# Patient Record
Sex: FEMALE | Race: WHITE | NOT HISPANIC OR LATINO
[De-identification: names, ages, dates, MRNs, and addresses within clinical notes are randomized per-mention and may not be internally consistent; named-entity substitution may affect disease eponyms.]

---

## 2017-05-31 ENCOUNTER — APPOINTMENT (OUTPATIENT)
Dept: CARDIOLOGY | Facility: CLINIC | Age: 52
End: 2017-05-31

## 2017-05-31 PROBLEM — Z00.00 ENCOUNTER FOR PREVENTIVE HEALTH EXAMINATION: Status: ACTIVE | Noted: 2017-05-31

## 2017-09-14 ENCOUNTER — RESULT REVIEW (OUTPATIENT)
Age: 52
End: 2017-09-14

## 2019-10-02 ENCOUNTER — RECORD ABSTRACTING (OUTPATIENT)
Age: 54
End: 2019-10-02

## 2019-10-02 DIAGNOSIS — F17.200 NICOTINE DEPENDENCE, UNSPECIFIED, UNCOMPLICATED: ICD-10-CM

## 2019-10-02 DIAGNOSIS — R87.610 ATYPICAL SQUAMOUS CELLS OF UNDETERMINED SIGNIFICANCE ON CYTOLOGIC SMEAR OF CERVIX (ASC-US): ICD-10-CM

## 2019-10-02 DIAGNOSIS — G40.909 EPILEPSY, UNSPECIFIED, NOT INTRACTABLE, W/OUT STATUS EPILEPTICUS: ICD-10-CM

## 2019-10-02 DIAGNOSIS — Z92.89 PERSONAL HISTORY OF OTHER MEDICAL TREATMENT: ICD-10-CM

## 2019-10-02 DIAGNOSIS — R23.2 FLUSHING: ICD-10-CM

## 2019-10-02 DIAGNOSIS — F32.9 MAJOR DEPRESSIVE DISORDER, SINGLE EPISODE, UNSPECIFIED: ICD-10-CM

## 2019-10-02 DIAGNOSIS — Z86.19 PERSONAL HISTORY OF OTHER INFECTIOUS AND PARASITIC DISEASES: ICD-10-CM

## 2019-10-02 DIAGNOSIS — Z80.3 FAMILY HISTORY OF MALIGNANT NEOPLASM OF BREAST: ICD-10-CM

## 2019-10-02 DIAGNOSIS — Z80.41 FAMILY HISTORY OF MALIGNANT NEOPLASM OF OVARY: ICD-10-CM

## 2019-10-02 DIAGNOSIS — G47.00 INSOMNIA, UNSPECIFIED: ICD-10-CM

## 2019-10-02 DIAGNOSIS — N95.2 POSTMENOPAUSAL ATROPHIC VAGINITIS: ICD-10-CM

## 2019-10-02 DIAGNOSIS — G47.9 SLEEP DISORDER, UNSPECIFIED: ICD-10-CM

## 2019-10-02 DIAGNOSIS — Z83.3 FAMILY HISTORY OF DIABETES MELLITUS: ICD-10-CM

## 2019-10-02 LAB — CYTOLOGY CVX/VAG DOC THIN PREP: NORMAL

## 2019-11-21 ENCOUNTER — APPOINTMENT (OUTPATIENT)
Dept: OBGYN | Facility: CLINIC | Age: 54
End: 2019-11-21
Payer: COMMERCIAL

## 2019-11-21 ENCOUNTER — TRANSCRIPTION ENCOUNTER (OUTPATIENT)
Age: 54
End: 2019-11-21

## 2019-11-21 VITALS
HEIGHT: 67 IN | SYSTOLIC BLOOD PRESSURE: 116 MMHG | DIASTOLIC BLOOD PRESSURE: 70 MMHG | WEIGHT: 179 LBS | BODY MASS INDEX: 28.09 KG/M2

## 2019-11-21 PROCEDURE — 99396 PREV VISIT EST AGE 40-64: CPT

## 2019-11-21 PROCEDURE — 82270 OCCULT BLOOD FECES: CPT

## 2019-11-21 RX ORDER — CLONIDINE 0.2 MG/24H
0.2 PATCH, EXTENDED RELEASE TRANSDERMAL
Refills: 0 | Status: DISCONTINUED | COMMUNITY
End: 2019-11-21

## 2019-12-26 LAB
CYTOLOGY CVX/VAG DOC THIN PREP: ABNORMAL
DATE COLLECTED: NORMAL
HEMOCCULT SP1 STL QL: NEGATIVE
HPV HIGH+LOW RISK DNA PNL CVX: NOT DETECTED
QUALITY CONTROL: YES

## 2020-04-13 NOTE — PHYSICAL EXAM
[Awake] : awake [Alert] : alert [Soft] : soft [Oriented x3] : oriented to person, place, and time [Labia Majora] : labia major [Labia Minora] : labia minora [Normal] : clitoris [No Bleeding] : there was no active vaginal bleeding [Pap Obtained] : a Pap smear was performed [Uterine Adnexae] : were not tender and not enlarged [No Tenderness] : no rectal tenderness [Nl Sphincter Tone] : normal sphincter tone [Acute Distress] : no acute distress [Mass] : no breast mass [Nipple Discharge] : no nipple discharge [Axillary LAD] : no axillary lymphadenopathy [Tender] : non tender [Occult Blood] : occult blood test from digital rectal exam was negative

## 2020-04-13 NOTE — HISTORY OF PRESENT ILLNESS
[Fair] : being in fair health [Healthy Diet] : a healthy diet [Regular Exercise] : regular exercise [Last Mammogram ___] : Last Mammogram was [unfilled] [Last Bone Density ___] : Last bone density studies [unfilled] [Last Colonoscopy ___] : Last colonoscopy [unfilled] [Last Pap ___] : Last cervical pap smear was [unfilled] [Postmenopausal] : is postmenopausal [Pregnancy History] : pregnancy history: [Total Preg ___] : [unfilled] [Full Term ___] : [unfilled] [Living ___] : [unfilled] [Monogamous (Male Partner)] : is monogamous with a male partner [Hot Flashes] : hot flashes [Night Sweats] : night sweats [Definite:  ___ (Date)] : the last menstrual period was [unfilled] [Menarche Age: ____] : age at menarche was [unfilled] [Sexually Active] : is sexually active [Monogamous] : is monogamous [Male ___] : [unfilled] male [Weight Concerns] : no concerns with her weight [Menstrual Problems] : reports normal menses [Contraception] : does not use contraception [de-identified] : 11/14/2018 [de-identified] : JESSICA U/S 8/13/2015 [Vaginal Itching] : no vaginal itching [Dyspareunia] : no dyspareunia [Mood Changes] : no mood changes

## 2020-04-13 NOTE — END OF VISIT
[FreeTextEntry3] : I, Caity Fournier, acted solely as a scribe for Dr. Roman on this 11/21/19. \par \par All medical record entries made by the Scribe were at Dr. Roman's direction and personally dictated by me on 11/21/19. I have reviewed the chart and agree that the record accurately reflects my personal performance of history, physical exam, assessment and plan. I have also personally directed, reviewed, and agreed with the chart.

## 2021-04-20 ENCOUNTER — APPOINTMENT (OUTPATIENT)
Dept: OBGYN | Facility: CLINIC | Age: 56
End: 2021-04-20
Payer: COMMERCIAL

## 2021-04-20 ENCOUNTER — RESULT CHARGE (OUTPATIENT)
Age: 56
End: 2021-04-20

## 2021-04-20 VITALS
HEIGHT: 67 IN | SYSTOLIC BLOOD PRESSURE: 132 MMHG | BODY MASS INDEX: 29.55 KG/M2 | DIASTOLIC BLOOD PRESSURE: 76 MMHG | WEIGHT: 188.25 LBS

## 2021-04-20 DIAGNOSIS — Z01.419 ENCOUNTER FOR GYNECOLOGICAL EXAMINATION (GENERAL) (ROUTINE) W/OUT ABNORMAL FINDINGS: ICD-10-CM

## 2021-04-20 PROCEDURE — 81003 URINALYSIS AUTO W/O SCOPE: CPT | Mod: QW

## 2021-04-20 PROCEDURE — 99396 PREV VISIT EST AGE 40-64: CPT

## 2021-04-20 PROCEDURE — 99072 ADDL SUPL MATRL&STAF TM PHE: CPT

## 2021-04-25 NOTE — HISTORY OF PRESENT ILLNESS
[Patient reported PAP Smear was abnormal] : Patient reported PAP Smear was abnormal [N] : Patient reports normal menses [Tubal Occlusion] : has had a tubal occlusion [Monogamous (Male Partner)] : is monogamous with a male partner [Y] : Positive pregnancy history [unknown] : Patient is unsure of the date of her LMP [Menarche Age: ____] : age at menarche was [unfilled] [Currently Active] : currently active [Men] : men [Vaginal] : vaginal [No] : No [Patient refuses STI testing] : Patient refuses STI testing [FreeTextEntry1] : SAGAR is here for her annual exam . She feels well with no gynecological complaints.\par \par Her last health maintenance visit was 2019. Patient reports normal menses. Last mammogram done on 2019 was significant for BR2. Patient had breast augmentation in 2019. Last cervical pap smear was done on 19 significant for ASCUS. Pregnancy history: .\par \par Patient reports she has experienced recent weight gain. Patient states that she has tried Weight Watchers but was not able to lose weight. Patient states she is now using appetite suppression injections. Patient states she has frequent constipation and takes medication daily to regulate her bowel movements. \par \par Patient reports she fell at work, resulting in a swollen neck. Patient also states that she fell back in . Patient is following with Orthopedist Dr. Perez. 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

## 2021-04-25 NOTE — DISCUSSION/SUMMARY
[FreeTextEntry1] : During this visit comprehensive counseling was given regarding the following concerns: \par \par 1. We discussed the need for proper nutrition and exercise. This includes cardiovascular and pelvic floor exercise. \par \par 2. We discussed the importance of maintaining a proper vaccination schedule and we discussed the utility of the flu vaccine and TDaP. \par \par 3. We discussed the impact of chronic sun exposure and the risk for skin disease as a result. The benefits of a total body scan by a Dermatologist was reviewed. \par \par 4. We discussed the need for certain supplements for most people which include Vitamin D3, calcium rich foods with limitation on calcium supplementation by tabular form to 600 mg by mouth daily. As a part of a bone health program we recommended weight bearing exercises, and some limited sun exposure (10-15 minutes daily) to help convert Vitamin D to its active form.\par \par 5. The definition of perimenopause and menopause were discussed with the patient. the etiology of vasomotor symptoms was explained. All of her concerns were addressed, questions answered and reassurances were given.\par \par We reviewed the results of  today's pelvic exam, significant for folliculitis. The importance of using a clean razor when shaving was discussed. Pap collected today. Prescription for mammogram screening given.\par \par During this visit 20 minutes were spent face-to-face with greater than 50% of the time dedicated to counseling.\par \par Patient will return in 1 year for annual or PRN.

## 2021-04-25 NOTE — REVIEW OF SYSTEMS
[Sight Problems] : sight problems [Breast Pain] : breast pain [Back Pain] : back pain [Negative] : Heme/Lymph [FreeTextEntry9] : NECK PAIN

## 2021-04-25 NOTE — END OF VISIT
[FreeTextEntry3] : I, Shazia Ndiaye solely acted as scribe for Dr. Usama Roman on 04/20/2021. All medical entries made by the Scribe were at my, Dr. Roman's, direction and personally dictated by me on 04/20/2021. I have reviewed the chart and agree that the record accurately reflects my personal performance of the history, physical exam, assessment, and plan. I have also personally directed, reviewed, and agreed with the chart.

## 2021-04-25 NOTE — PHYSICAL EXAM
[Appropriately responsive] : appropriately responsive [Alert] : alert [No Acute Distress] : no acute distress [No Lymphadenopathy] : no lymphadenopathy [Soft] : soft [Non-tender] : non-tender [Non-distended] : non-distended [No HSM] : No HSM [No Lesions] : no lesions [No Mass] : no mass [Oriented x3] : oriented x3 [Examination Of The Breasts] : a normal appearance [] : implants [No Discharge] : no discharge [No Masses] : no breast masses were palpable [Labia Majora] : normal [Labia Minora] : normal [No Bleeding] : There was no active vaginal bleeding [Normal] : normal [Uterine Adnexae] : normal [No Tenderness] : no tenderness [Nl Sphincter Tone] : normal sphincter tone [FreeTextEntry9] : Stool for occult blood.

## 2021-11-10 ENCOUNTER — OUTPATIENT (OUTPATIENT)
Dept: OUTPATIENT SERVICES | Facility: HOSPITAL | Age: 56
LOS: 1 days | End: 2021-11-10
Payer: OTHER MISCELLANEOUS

## 2021-11-10 VITALS
WEIGHT: 167.55 LBS | OXYGEN SATURATION: 98 % | SYSTOLIC BLOOD PRESSURE: 118 MMHG | DIASTOLIC BLOOD PRESSURE: 80 MMHG | TEMPERATURE: 98 F | HEIGHT: 66.5 IN | RESPIRATION RATE: 16 BRPM | HEART RATE: 87 BPM

## 2021-11-10 DIAGNOSIS — Z98.891 HISTORY OF UTERINE SCAR FROM PREVIOUS SURGERY: Chronic | ICD-10-CM

## 2021-11-10 DIAGNOSIS — Z90.49 ACQUIRED ABSENCE OF OTHER SPECIFIED PARTS OF DIGESTIVE TRACT: Chronic | ICD-10-CM

## 2021-11-10 DIAGNOSIS — Z90.89 ACQUIRED ABSENCE OF OTHER ORGANS: Chronic | ICD-10-CM

## 2021-11-10 DIAGNOSIS — Z98.890 OTHER SPECIFIED POSTPROCEDURAL STATES: Chronic | ICD-10-CM

## 2021-11-10 DIAGNOSIS — M43.22 FUSION OF SPINE, CERVICAL REGION: Chronic | ICD-10-CM

## 2021-11-10 DIAGNOSIS — Z01.818 ENCOUNTER FOR OTHER PREPROCEDURAL EXAMINATION: ICD-10-CM

## 2021-11-10 DIAGNOSIS — M54.2 CERVICALGIA: ICD-10-CM

## 2021-11-10 DIAGNOSIS — Z29.9 ENCOUNTER FOR PROPHYLACTIC MEASURES, UNSPECIFIED: ICD-10-CM

## 2021-11-10 LAB
A1C WITH ESTIMATED AVERAGE GLUCOSE RESULT: 5.3 % — SIGNIFICANT CHANGE UP (ref 4–5.6)
ALBUMIN SERPL ELPH-MCNC: 3.7 G/DL — SIGNIFICANT CHANGE UP (ref 3.3–5)
ANION GAP SERPL CALC-SCNC: 5 MMOL/L — SIGNIFICANT CHANGE UP (ref 5–17)
APPEARANCE UR: CLEAR — SIGNIFICANT CHANGE UP
APTT BLD: 30.5 SEC — SIGNIFICANT CHANGE UP (ref 27.5–35.5)
BASOPHILS # BLD AUTO: 0.07 K/UL — SIGNIFICANT CHANGE UP (ref 0–0.2)
BASOPHILS NFR BLD AUTO: 0.5 % — SIGNIFICANT CHANGE UP (ref 0–2)
BILIRUB UR-MCNC: NEGATIVE — SIGNIFICANT CHANGE UP
BUN SERPL-MCNC: 15 MG/DL — SIGNIFICANT CHANGE UP (ref 7–23)
CALCIUM SERPL-MCNC: 9.4 MG/DL — SIGNIFICANT CHANGE UP (ref 8.5–10.1)
CHLORIDE SERPL-SCNC: 108 MMOL/L — SIGNIFICANT CHANGE UP (ref 96–108)
CO2 SERPL-SCNC: 26 MMOL/L — SIGNIFICANT CHANGE UP (ref 22–31)
COLOR SPEC: YELLOW — SIGNIFICANT CHANGE UP
CREAT SERPL-MCNC: 0.83 MG/DL — SIGNIFICANT CHANGE UP (ref 0.5–1.3)
DIFF PNL FLD: NEGATIVE — SIGNIFICANT CHANGE UP
EOSINOPHIL # BLD AUTO: 0.11 K/UL — SIGNIFICANT CHANGE UP (ref 0–0.5)
EOSINOPHIL NFR BLD AUTO: 0.8 % — SIGNIFICANT CHANGE UP (ref 0–6)
ESTIMATED AVERAGE GLUCOSE: 105 MG/DL — SIGNIFICANT CHANGE UP (ref 68–114)
GLUCOSE SERPL-MCNC: 100 MG/DL — HIGH (ref 70–99)
GLUCOSE UR QL: NEGATIVE MG/DL — SIGNIFICANT CHANGE UP
HCT VFR BLD CALC: 42.4 % — SIGNIFICANT CHANGE UP (ref 34.5–45)
HGB BLD-MCNC: 14.3 G/DL — SIGNIFICANT CHANGE UP (ref 11.5–15.5)
IMM GRANULOCYTES NFR BLD AUTO: 0.5 % — SIGNIFICANT CHANGE UP (ref 0–1.5)
INR BLD: 0.92 RATIO — SIGNIFICANT CHANGE UP (ref 0.88–1.16)
KETONES UR-MCNC: NEGATIVE — SIGNIFICANT CHANGE UP
LEUKOCYTE ESTERASE UR-ACNC: NEGATIVE — SIGNIFICANT CHANGE UP
LYMPHOCYTES # BLD AUTO: 28 % — SIGNIFICANT CHANGE UP (ref 13–44)
LYMPHOCYTES # BLD AUTO: 3.64 K/UL — HIGH (ref 1–3.3)
MCHC RBC-ENTMCNC: 30.8 PG — SIGNIFICANT CHANGE UP (ref 27–34)
MCHC RBC-ENTMCNC: 33.7 GM/DL — SIGNIFICANT CHANGE UP (ref 32–36)
MCV RBC AUTO: 91.4 FL — SIGNIFICANT CHANGE UP (ref 80–100)
MONOCYTES # BLD AUTO: 0.63 K/UL — SIGNIFICANT CHANGE UP (ref 0–0.9)
MONOCYTES NFR BLD AUTO: 4.8 % — SIGNIFICANT CHANGE UP (ref 2–14)
MRSA PCR RESULT.: SIGNIFICANT CHANGE UP
NEUTROPHILS # BLD AUTO: 8.49 K/UL — HIGH (ref 1.8–7.4)
NEUTROPHILS NFR BLD AUTO: 65.4 % — SIGNIFICANT CHANGE UP (ref 43–77)
NITRITE UR-MCNC: NEGATIVE — SIGNIFICANT CHANGE UP
PH UR: 5 — SIGNIFICANT CHANGE UP (ref 5–8)
PLATELET # BLD AUTO: 301 K/UL — SIGNIFICANT CHANGE UP (ref 150–400)
POTASSIUM SERPL-MCNC: 4.4 MMOL/L — SIGNIFICANT CHANGE UP (ref 3.5–5.3)
POTASSIUM SERPL-SCNC: 4.4 MMOL/L — SIGNIFICANT CHANGE UP (ref 3.5–5.3)
PROT UR-MCNC: NEGATIVE MG/DL — SIGNIFICANT CHANGE UP
PROTHROM AB SERPL-ACNC: 10.8 SEC — SIGNIFICANT CHANGE UP (ref 10.6–13.6)
RBC # BLD: 4.64 M/UL — SIGNIFICANT CHANGE UP (ref 3.8–5.2)
RBC # FLD: 12.7 % — SIGNIFICANT CHANGE UP (ref 10.3–14.5)
S AUREUS DNA NOSE QL NAA+PROBE: DETECTED
SODIUM SERPL-SCNC: 139 MMOL/L — SIGNIFICANT CHANGE UP (ref 135–145)
SP GR SPEC: 1.02 — SIGNIFICANT CHANGE UP (ref 1.01–1.02)
UROBILINOGEN FLD QL: NEGATIVE MG/DL — SIGNIFICANT CHANGE UP
WBC # BLD: 13 K/UL — HIGH (ref 3.8–10.5)
WBC # FLD AUTO: 13 K/UL — HIGH (ref 3.8–10.5)

## 2021-11-10 PROCEDURE — 82040 ASSAY OF SERUM ALBUMIN: CPT

## 2021-11-10 PROCEDURE — 85730 THROMBOPLASTIN TIME PARTIAL: CPT

## 2021-11-10 PROCEDURE — G0463: CPT | Mod: 25

## 2021-11-10 PROCEDURE — 81003 URINALYSIS AUTO W/O SCOPE: CPT

## 2021-11-10 PROCEDURE — 86900 BLOOD TYPING SEROLOGIC ABO: CPT

## 2021-11-10 PROCEDURE — 85025 COMPLETE CBC W/AUTO DIFF WBC: CPT

## 2021-11-10 PROCEDURE — 80048 BASIC METABOLIC PNL TOTAL CA: CPT

## 2021-11-10 PROCEDURE — 93005 ELECTROCARDIOGRAM TRACING: CPT

## 2021-11-10 PROCEDURE — 85610 PROTHROMBIN TIME: CPT

## 2021-11-10 PROCEDURE — 86850 RBC ANTIBODY SCREEN: CPT

## 2021-11-10 PROCEDURE — 36415 COLL VENOUS BLD VENIPUNCTURE: CPT

## 2021-11-10 PROCEDURE — 93010 ELECTROCARDIOGRAM REPORT: CPT

## 2021-11-10 PROCEDURE — 86901 BLOOD TYPING SEROLOGIC RH(D): CPT

## 2021-11-10 PROCEDURE — 87641 MR-STAPH DNA AMP PROBE: CPT

## 2021-11-10 PROCEDURE — 71046 X-RAY EXAM CHEST 2 VIEWS: CPT | Mod: 26

## 2021-11-10 PROCEDURE — 83036 HEMOGLOBIN GLYCOSYLATED A1C: CPT

## 2021-11-10 PROCEDURE — 87640 STAPH A DNA AMP PROBE: CPT

## 2021-11-10 PROCEDURE — 71046 X-RAY EXAM CHEST 2 VIEWS: CPT

## 2021-11-10 NOTE — H&P PST ADULT - ASSESSMENT
56 y.o female scheduled for  56 y.o female scheduled for  Anterior Cervical Spine Fusion, Anterior Cervical Discectomy and Fusion, C4-5, C 6-7, Cages, Local Bone Graft, Bone Protein Segmental Spinal Instrumentation   Plan  1. Stop all NSAIDS, herbal supplements and vitamins for 7 days.  2. NPO at midnight.  3. Take the following medications Omeprazole  with small sips of water on the morning of your procedure/surgery.  4. Use EZ sponges as directed  5. Use mupirocin as directed  6. Labs, EKG, CXR as per surgeon  7. PMD CORBIN Santiago  visit for optimization prior to surgery as per surgeon.  8. COVID swab appt: 11/15/2021    CAPRINI SCORE    AGE RELATED RISK FACTORS                                                       MOBILITY RELATED FACTORS  [ x] Age 41-60 years                                            (1 Point)                  [ ] Bed rest                                                        (1 Point)  [ ] Age: 61-74 years                                           (2 Points)                [ ] Plaster cast                                                   (2 Points)  [ ] Age= 75 years                                              (3 Points)                 [ ] Bed bound for more than 72 hours                   (2 Points)    DISEASE RELATED RISK FACTORS                                               GENDER SPECIFIC FACTORS  [ ] Edema in the lower extremities                       (1 Point)                  [ ] Pregnancy                                                     (1 Point)  [ ] Varicose veins                                               (1 Point)                  [ ] Post-partum < 6 weeks                                   (1 Point)             [x ] BMI > 25 Kg/m2                                            (1 Point)                  [ ] Hormonal therapy  or oral contraception            (1 Point)                 [ ] Sepsis (in the previous month)                        (1 Point)                  [ ] History of pregnancy complications  [ ] Pneumonia or serious lung disease                                               [ ] Unexplained or recurrent                       (1 Point)           (in the previous month)                               (1 Point)  [ ] Abnormal pulmonary function test                     (1 Point)                 SURGERY RELATED RISK FACTORS  [ ] Acute myocardial infarction                              (1 Point)                 [ ]  Section                                            (1 Point)  [ ] Congestive heart failure (in the previous month)  (1 Point)                 [ ] Minor surgery                                                 (1 Point)   [ ] Inflammatory bowel disease                             (1 Point)                 [ ] Arthroscopic surgery                                        (2 Points)  [ ] Central venous access                                    (2 Points)                [ x] General surgery lasting more than 45 minutes   (2 Points)       [ ] Stroke (in the previous month)                          (5 Points)               [ ] Elective arthroplasty                                        (5 Points)                                                                                                                                               HEMATOLOGY RELATED FACTORS                                                 TRAUMA RELATED RISK FACTORS  [ ] Prior episodes of VTE                                     (3 Points)                 [ ] Fracture of the hip, pelvis, or leg                       (5 Points)  [ ] Positive family history for VTE                         (3 Points)                 [ ] Acute spinal cord injury (in the previous month)  (5 Points)  [ ] Prothrombin 82252 A                                      (3 Points)                 [ ] Paralysis  (less than 1 month)                          (5 Points)  [ ] Factor V Leiden                                             (3 Points)                 [ ] Multiple Trauma within 1 month                         (5 Points)  [ ] Lupus anticoagulants                                     (3 Points)                                                           [ ] Anticardiolipin antibodies                                (3 Points)                                                       [ ] High homocysteine in the blood                      (3 Points)                                             [ ] Other congenital or acquired thrombophilia       (3 Points)                                                [ ] Heparin induced thrombocytopenia                  (3 Points)                                          Total Score [      4  ]    The Caprini score indicates this patient is at risk for a VTE event (score 3-5).  Most surgical patients in this group would benefit from pharmacologic prophylaxis.  The surgical team will determine the balance between VTE risk and bleeding risk
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
## 2021-11-10 NOTE — H&P PST ADULT - NSICDXPASTMEDICALHX_GEN_ALL_CORE_FT
PAST MEDICAL HISTORY:  2019 novel coronavirus disease (COVID-19) 12/2020--Hospitalized , pericarditis , recovered , followed with cardiology post discharge Dr Delta Davalos    Allergic sinusitis     Cervical disc disease     Cervical disc herniation     COVID-19 vaccine series completed Pfizer --completed 4/2021, booster Moderna--11/1/2021    GERD (gastroesophageal reflux disease)     History of colitis     Sleep apnea No CPAP or oral appliance--patient states insurance did not cover

## 2021-11-10 NOTE — H&P PST ADULT - FUNCTIONAL SCREEN CURRENT LEVEL: DRESSING, MLM
Detail Level: Simple Was A Bandage Applied: Yes Punch Size In Mm: 6 Biopsy Type: H and E Anesthesia Type: 1% lidocaine with epinephrine Anesthesia Volume In Cc (Will Not Render If 0): 1.5 Additional Anesthesia Volume In Cc (Will Not Render If 0): 0 Hemostasis: Dinorah's Epidermal Sutures: 5-0 Fast Absorbing Gut Wound Care: Petrolatum Dressing: pressure dressing with telfa Patient Will Remove Sutures At Home?: No Lab: [facility] 0 = independent Path Notes (To The Dermatopathologist): R/O Consent: Written consent was obtained and risks were reviewed including but not limited to scarring, infection, bleeding, scabbing, incomplete removal, nerve damage and allergy to anesthesia. Post-Care Instructions: I reviewed with the patient in detail post-care instructions. Patient is to keep the biopsy site dry overnight, and then apply bacitracin twice daily until healed. Patient may apply hydrogen peroxide soaks to remove any crusting. Home Suture Removal Text: Patient was provided a home suture removal kit and will remove their sutures at home. If they have any questions or difficulties they will call the office. Notification Instructions: Patient will be notified of biopsy results. However, patient instructed to call the office if not contacted within 2 weeks. Billing Type: [employer] Information: Selecting Yes will display possible errors in your note based on the variables you have selected. This validation is only offered as a suggestion for you. PLEASE NOTE THAT THE VALIDATION TEXT WILL BE REMOVED WHEN YOU FINALIZE YOUR NOTE. IF YOU WANT TO FAX A PRELIMINARY NOTE YOU WILL NEED TO TOGGLE THIS TO 'NO' IF YOU DO NOT WANT IT IN YOUR FAXED NOTE.
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
## 2021-11-10 NOTE — H&P PST ADULT - NSICDXPASTSURGICALHX_GEN_ALL_CORE_FT
PAST SURGICAL HISTORY:  Cervical vertebral fusion     H/O  section , ,    History of cardiac cath 10-15 yrs ago    History of laparoscopic cholecystectomy     History of tonsillectomy 1969

## 2021-11-11 DIAGNOSIS — M54.2 CERVICALGIA: ICD-10-CM

## 2021-11-11 DIAGNOSIS — Z01.818 ENCOUNTER FOR OTHER PREPROCEDURAL EXAMINATION: ICD-10-CM

## 2021-11-11 NOTE — CHART NOTE - NSCHARTNOTEFT_GEN_A_CORE
MSSA detected from nasal swab done in [facility] 11/10/2021. Patient instructed to start applying Mupirocin to nostrils 2 times per day for 5 days starting 11/13/2021. Questions answered.

## 2021-11-15 ENCOUNTER — APPOINTMENT (OUTPATIENT)
Dept: DISASTER EMERGENCY | Facility: CLINIC | Age: 56
End: 2021-11-15

## 2021-11-15 DIAGNOSIS — Z01.818 ENCOUNTER FOR OTHER PREPROCEDURAL EXAMINATION: ICD-10-CM

## 2021-11-16 LAB — SARS-COV-2 N GENE NPH QL NAA+PROBE: NOT DETECTED

## 2021-11-18 ENCOUNTER — RESULT REVIEW (OUTPATIENT)
Age: 56
End: 2021-11-18

## 2021-11-18 ENCOUNTER — INPATIENT (INPATIENT)
Facility: HOSPITAL | Age: 56
LOS: 1 days | Discharge: HOME | DRG: 321 | End: 2021-11-20
Attending: ORTHOPAEDIC SURGERY | Admitting: ORTHOPAEDIC SURGERY
Payer: OTHER MISCELLANEOUS

## 2021-11-18 VITALS
TEMPERATURE: 97 F | HEART RATE: 56 BPM | DIASTOLIC BLOOD PRESSURE: 66 MMHG | RESPIRATION RATE: 16 BRPM | HEIGHT: 66.5 IN | WEIGHT: 167.55 LBS | SYSTOLIC BLOOD PRESSURE: 119 MMHG

## 2021-11-18 DIAGNOSIS — Z98.890 OTHER SPECIFIED POSTPROCEDURAL STATES: Chronic | ICD-10-CM

## 2021-11-18 DIAGNOSIS — Z90.89 ACQUIRED ABSENCE OF OTHER ORGANS: Chronic | ICD-10-CM

## 2021-11-18 DIAGNOSIS — Z90.49 ACQUIRED ABSENCE OF OTHER SPECIFIED PARTS OF DIGESTIVE TRACT: Chronic | ICD-10-CM

## 2021-11-18 DIAGNOSIS — M54.2 CERVICALGIA: ICD-10-CM

## 2021-11-18 DIAGNOSIS — M43.22 FUSION OF SPINE, CERVICAL REGION: Chronic | ICD-10-CM

## 2021-11-18 DIAGNOSIS — Z98.891 HISTORY OF UTERINE SCAR FROM PREVIOUS SURGERY: Chronic | ICD-10-CM

## 2021-11-18 PROCEDURE — C1889: CPT

## 2021-11-18 PROCEDURE — 86769 SARS-COV-2 COVID-19 ANTIBODY: CPT

## 2021-11-18 PROCEDURE — 82962 GLUCOSE BLOOD TEST: CPT

## 2021-11-18 PROCEDURE — 36415 COLL VENOUS BLD VENIPUNCTURE: CPT

## 2021-11-18 PROCEDURE — C1713: CPT

## 2021-11-18 PROCEDURE — 94640 AIRWAY INHALATION TREATMENT: CPT

## 2021-11-18 PROCEDURE — 90686 IIV4 VACC NO PRSV 0.5 ML IM: CPT

## 2021-11-18 PROCEDURE — 76000 FLUOROSCOPY <1 HR PHYS/QHP: CPT

## 2021-11-18 PROCEDURE — 97116 GAIT TRAINING THERAPY: CPT | Mod: GP

## 2021-11-18 PROCEDURE — 88304 TISSUE EXAM BY PATHOLOGIST: CPT | Mod: 26

## 2021-11-18 PROCEDURE — 97161 PT EVAL LOW COMPLEX 20 MIN: CPT | Mod: GP

## 2021-11-18 PROCEDURE — 88304 TISSUE EXAM BY PATHOLOGIST: CPT

## 2021-11-18 PROCEDURE — 85027 COMPLETE CBC AUTOMATED: CPT

## 2021-11-18 PROCEDURE — 80048 BASIC METABOLIC PNL TOTAL CA: CPT

## 2021-11-18 RX ORDER — SODIUM CHLORIDE 9 MG/ML
1000 INJECTION INTRAMUSCULAR; INTRAVENOUS; SUBCUTANEOUS
Refills: 0 | Status: DISCONTINUED | OUTPATIENT
Start: 2021-11-18 | End: 2021-11-20

## 2021-11-18 RX ORDER — NALOXONE HYDROCHLORIDE 4 MG/.1ML
0.1 SPRAY NASAL
Refills: 0 | Status: DISCONTINUED | OUTPATIENT
Start: 2021-11-18 | End: 2021-11-20

## 2021-11-18 RX ORDER — HYDROMORPHONE HYDROCHLORIDE 2 MG/ML
0.5 INJECTION INTRAMUSCULAR; INTRAVENOUS; SUBCUTANEOUS
Refills: 0 | Status: DISCONTINUED | OUTPATIENT
Start: 2021-11-18 | End: 2021-11-19

## 2021-11-18 RX ORDER — HYDROMORPHONE HYDROCHLORIDE 2 MG/ML
2 INJECTION INTRAMUSCULAR; INTRAVENOUS; SUBCUTANEOUS
Refills: 0 | Status: DISCONTINUED | OUTPATIENT
Start: 2021-11-18 | End: 2021-11-20

## 2021-11-18 RX ORDER — CYCLOBENZAPRINE HYDROCHLORIDE 10 MG/1
10 TABLET, FILM COATED ORAL EVERY 8 HOURS
Refills: 0 | Status: DISCONTINUED | OUTPATIENT
Start: 2021-11-18 | End: 2021-11-20

## 2021-11-18 RX ORDER — HYDROMORPHONE HYDROCHLORIDE 2 MG/ML
0.5 INJECTION INTRAMUSCULAR; INTRAVENOUS; SUBCUTANEOUS
Refills: 0 | Status: DISCONTINUED | OUTPATIENT
Start: 2021-11-18 | End: 2021-11-18

## 2021-11-18 RX ORDER — ONDANSETRON 8 MG/1
4 TABLET, FILM COATED ORAL ONCE
Refills: 0 | Status: DISCONTINUED | OUTPATIENT
Start: 2021-11-18 | End: 2021-11-18

## 2021-11-18 RX ORDER — SODIUM CHLORIDE 9 MG/ML
1000 INJECTION, SOLUTION INTRAVENOUS
Refills: 0 | Status: DISCONTINUED | OUTPATIENT
Start: 2021-11-18 | End: 2021-11-18

## 2021-11-18 RX ORDER — MONTELUKAST 4 MG/1
10 TABLET, CHEWABLE ORAL AT BEDTIME
Refills: 0 | Status: DISCONTINUED | OUTPATIENT
Start: 2021-11-18 | End: 2021-11-20

## 2021-11-18 RX ORDER — VANCOMYCIN HCL 1 G
1000 VIAL (EA) INTRAVENOUS EVERY 12 HOURS
Refills: 0 | Status: COMPLETED | OUTPATIENT
Start: 2021-11-18 | End: 2021-11-19

## 2021-11-18 RX ORDER — CELECOXIB 200 MG/1
200 CAPSULE ORAL DAILY
Refills: 0 | Status: DISCONTINUED | OUTPATIENT
Start: 2021-11-19 | End: 2021-11-20

## 2021-11-18 RX ORDER — ONDANSETRON 8 MG/1
4 TABLET, FILM COATED ORAL EVERY 6 HOURS
Refills: 0 | Status: DISCONTINUED | OUTPATIENT
Start: 2021-11-18 | End: 2021-11-20

## 2021-11-18 RX ORDER — LORATADINE 10 MG/1
10 TABLET ORAL DAILY
Refills: 0 | Status: DISCONTINUED | OUTPATIENT
Start: 2021-11-18 | End: 2021-11-20

## 2021-11-18 RX ORDER — INFLUENZA VIRUS VACCINE 15; 15; 15; 15 UG/.5ML; UG/.5ML; UG/.5ML; UG/.5ML
0.5 SUSPENSION INTRAMUSCULAR ONCE
Refills: 0 | Status: COMPLETED | OUTPATIENT
Start: 2021-11-18 | End: 2021-11-20

## 2021-11-18 RX ORDER — CELECOXIB 200 MG/1
400 CAPSULE ORAL ONCE
Refills: 0 | Status: COMPLETED | OUTPATIENT
Start: 2021-11-18 | End: 2021-11-18

## 2021-11-18 RX ORDER — FLUTICASONE PROPIONATE 50 MCG
1 SPRAY, SUSPENSION NASAL DAILY
Refills: 0 | Status: DISCONTINUED | OUTPATIENT
Start: 2021-11-18 | End: 2021-11-20

## 2021-11-18 RX ORDER — VANCOMYCIN HCL 1 G
1000 VIAL (EA) INTRAVENOUS ONCE
Refills: 0 | Status: COMPLETED | OUTPATIENT
Start: 2021-11-18 | End: 2021-11-18

## 2021-11-18 RX ORDER — ACETAMINOPHEN 500 MG
650 TABLET ORAL EVERY 6 HOURS
Refills: 0 | Status: DISCONTINUED | OUTPATIENT
Start: 2021-11-18 | End: 2021-11-20

## 2021-11-18 RX ORDER — PANTOPRAZOLE SODIUM 20 MG/1
40 TABLET, DELAYED RELEASE ORAL
Refills: 0 | Status: DISCONTINUED | OUTPATIENT
Start: 2021-11-18 | End: 2021-11-20

## 2021-11-18 RX ORDER — HYDROMORPHONE HYDROCHLORIDE 2 MG/ML
30 INJECTION INTRAMUSCULAR; INTRAVENOUS; SUBCUTANEOUS
Refills: 0 | Status: DISCONTINUED | OUTPATIENT
Start: 2021-11-18 | End: 2021-11-19

## 2021-11-18 RX ADMIN — Medication 650 MILLIGRAM(S): at 23:48

## 2021-11-18 RX ADMIN — HYDROMORPHONE HYDROCHLORIDE 0.5 MILLIGRAM(S): 2 INJECTION INTRAMUSCULAR; INTRAVENOUS; SUBCUTANEOUS at 11:28

## 2021-11-18 RX ADMIN — SODIUM CHLORIDE 100 MILLILITER(S): 9 INJECTION INTRAMUSCULAR; INTRAVENOUS; SUBCUTANEOUS at 14:09

## 2021-11-18 RX ADMIN — Medication 1 TABLET(S): at 15:08

## 2021-11-18 RX ADMIN — Medication 250 MILLIGRAM(S): at 20:04

## 2021-11-18 RX ADMIN — LORATADINE 10 MILLIGRAM(S): 10 TABLET ORAL at 15:08

## 2021-11-18 RX ADMIN — CYCLOBENZAPRINE HYDROCHLORIDE 10 MILLIGRAM(S): 10 TABLET, FILM COATED ORAL at 22:04

## 2021-11-18 RX ADMIN — SODIUM CHLORIDE 75 MILLILITER(S): 9 INJECTION, SOLUTION INTRAVENOUS at 11:29

## 2021-11-18 RX ADMIN — HYDROMORPHONE HYDROCHLORIDE 30 MILLILITER(S): 2 INJECTION INTRAMUSCULAR; INTRAVENOUS; SUBCUTANEOUS at 11:09

## 2021-11-18 RX ADMIN — CELECOXIB 400 MILLIGRAM(S): 200 CAPSULE ORAL at 15:08

## 2021-11-18 RX ADMIN — Medication 250 MILLIGRAM(S): at 07:54

## 2021-11-18 RX ADMIN — CYCLOBENZAPRINE HYDROCHLORIDE 10 MILLIGRAM(S): 10 TABLET, FILM COATED ORAL at 15:08

## 2021-11-18 RX ADMIN — CELECOXIB 400 MILLIGRAM(S): 200 CAPSULE ORAL at 16:00

## 2021-11-18 RX ADMIN — Medication 1 SPRAY(S): at 15:08

## 2021-11-18 RX ADMIN — MONTELUKAST 10 MILLIGRAM(S): 4 TABLET, CHEWABLE ORAL at 22:04

## 2021-11-18 NOTE — BRIEF OPERATIVE NOTE - ANTIBIOTIC PROTOCOL
Followed protocol Duration Of Freeze Thaw-Cycle (Seconds): 10 Post-Care Instructions: I reviewed with the patient in detail post-care instructions. Patient is to wear sunprotection, and avoid picking at any of the treated lesions. Pt may apply Vaseline to crusted or scabbing areas. Render Post-Care Instructions In Note?: no Number Of Freeze-Thaw Cycles: 2 freeze-thaw cycles Detail Level: Simple Consent: The patient's consent was obtained including but not limited to risks of crusting, scabbing, blistering, scarring, darker or lighter pigmentary change, recurrence, incomplete removal and infection. Diverticulitis    Diverticulitis is inflammation or infection of small pouches in your colon that form when you have a condition called diverticulosis. This condition can range from mild to severe potentially leading to perforation or obstructions of your colon. Symptoms include abdominal pain, fever/chills, nausea, vomiting, diarrhea, constipation, or blood in your stool. If you were prescribed an antibiotic medicine, take it as told by your health care provider. Do not stop taking the antibiotic even if you start to feel better.    SEEK IMMEDIATE MEDICAL CARE IF YOU HAVE THE FOLLOWING SYMPTOMS: worsening abdominal pain, high fever, inability to hold down liquids or medication, black or bloody stools, inability to pass gas, lightheadedness/dizziness, or a change in mental status.            Diverticulitis Diet    WHAT YOU NEED TO KNOW:    A diverticulitis diet includes foods that allow your intestines to rest while you have diverticulitis. Diverticulitis is a condition that causes small pockets along your intestine called diverticula to become inflamed or infected. This is caused by hard bowel movement, food, or bacteria that get stuck in the pockets.    DISCHARGE INSTRUCTIONS:    Foods that may be recommended while you have diverticulitis:     A clear liquid diet may be recommended for 2 to 3 days. A clear liquid diet includes clear liquids, and foods that are liquid at room temperature. Examples include the following:   Water and clear juices (such as apple, cranberry, or grape), strained citrus juices or fruit punch      Coffee or tea (without cream or milk)      Clear sports drinks or soft drinks, such as ginger ale, lemon-lime soda, or club soda (no cola or root beer)      Clear broth, bouillon, or consommé      Plain popsicles (no popsicles with pureed fruit or fiber)      Flavored gelatin without fruit      Low-fiber foods may be recommended until your symptoms improve. Examples include the following:   Cream of wheat and finely ground grits      White bread, white pasta, and white rice      Canned and well-cooked fruit without skins or seeds, and juice without pulp      Canned and well-cooked vegetables without skins or seeds, and vegetable juice      Cow's milk, lactose-free milk, soy milk, and rice milk      Yogurt, cottage cheese, and sherbet      Eggs, poultry (such as chicken and turkey), fish, and tender, ground, well-cooked beef       Tofu and smooth nut butters, such as peanut butter      Broth and strained soups made of low-fiber foods     High-fiber foods can help prevent diverticulosis and diverticulitis. Your healthcare provider will tell you when you can add high-fiber foods back into your diet. Examples include the following:     Whole grains and breads, and cereals made with whole grains      Dried fruit, fresh fruit with skin, and fruit pulp      Raw vegetables      Cooked greens, such as spinach      Tough meat and meat with gristle      Legumes, such as paige beans and lentils     Contact your healthcare provider if:     Your symptoms do not get better, or they get worse.       You have questions about the foods you should eat.      You have questions or concerns about your condition or care.

## 2021-11-18 NOTE — BRIEF OPERATIVE NOTE - NSICDXBRIEFPOSTOP_GEN_ALL_CORE_FT
POST-OP DIAGNOSIS:  Cervical herniated disc 18-Nov-2021 11:51:01  Jacky Perez  Cervical herniated disc 18-Nov-2021 11:51:05  Jacky Perez

## 2021-11-18 NOTE — BRIEF OPERATIVE NOTE - NSICDXBRIEFPROCEDURE_GEN_ALL_CORE_FT
PROCEDURES:  Anterior cervical discectomy and fusion (ACDF) with plating and bone graft at 2 levels 18-Nov-2021 11:49:27  Jacky Perez  Exploration, spinal fusion 18-Nov-2021 11:49:46  Jacky Perez  Exploration and fusion, spine, cervical, 2 levels 18-Nov-2021 11:49:49  Jacky Perez

## 2021-11-19 DIAGNOSIS — M96.1 POSTLAMINECTOMY SYNDROME, NOT ELSEWHERE CLASSIFIED: ICD-10-CM

## 2021-11-19 PROCEDURE — 99252 IP/OBS CONSLTJ NEW/EST SF 35: CPT

## 2021-11-19 RX ORDER — OXYCODONE HYDROCHLORIDE 5 MG/1
5 TABLET ORAL
Refills: 0 | Status: DISCONTINUED | OUTPATIENT
Start: 2021-11-19 | End: 2021-11-20

## 2021-11-19 RX ORDER — OXYCODONE HYDROCHLORIDE 5 MG/1
10 TABLET ORAL
Refills: 0 | Status: DISCONTINUED | OUTPATIENT
Start: 2021-11-19 | End: 2021-11-20

## 2021-11-19 RX ORDER — ALPRAZOLAM 0.25 MG
0.25 TABLET ORAL EVERY 6 HOURS
Refills: 0 | Status: DISCONTINUED | OUTPATIENT
Start: 2021-11-19 | End: 2021-11-20

## 2021-11-19 RX ADMIN — CYCLOBENZAPRINE HYDROCHLORIDE 10 MILLIGRAM(S): 10 TABLET, FILM COATED ORAL at 21:46

## 2021-11-19 RX ADMIN — ONDANSETRON 4 MILLIGRAM(S): 8 TABLET, FILM COATED ORAL at 15:53

## 2021-11-19 RX ADMIN — CYCLOBENZAPRINE HYDROCHLORIDE 10 MILLIGRAM(S): 10 TABLET, FILM COATED ORAL at 12:40

## 2021-11-19 RX ADMIN — MONTELUKAST 10 MILLIGRAM(S): 4 TABLET, CHEWABLE ORAL at 21:46

## 2021-11-19 RX ADMIN — Medication 0.25 MILLIGRAM(S): at 19:01

## 2021-11-19 RX ADMIN — Medication 250 MILLIGRAM(S): at 10:06

## 2021-11-19 RX ADMIN — PANTOPRAZOLE SODIUM 40 MILLIGRAM(S): 20 TABLET, DELAYED RELEASE ORAL at 08:09

## 2021-11-19 NOTE — CONSULT NOTE ADULT - SUBJECTIVE AND OBJECTIVE BOX
56 y.o. female s/p elective C-spine ACDF on 21 - tolerated ambulation with PT, c/o pain and need to sleep  Other ROS reviewed and neg     PAST MEDICAL HISTORY:  2019 novel coronavirus disease (COVID-19) 2020--Hospitalized , pericarditis , recovered , followed with cardiology post discharge Dr Delta Davalos  Allergic sinusitis   Cervical disc disease   Cervical disc herniation   COVID-19 vaccine series completed Pfizer --completed 2021, booster Moderna--2021  GERD (gastroesophageal reflux disease)   History of colitis   Sleep apnea No CPAP or oral appliance--patient states insurance did not cover.     PAST SURGICAL HISTORY:  Cervical vertebral fusion   H/O  section , ,  History of cardiac cath 10-15 yrs ago  History of laparoscopic cholecystectomy   History of tonsillectomy .     FAMILY HISTORY:  FH: heart attack, Mother, , age 65---also DM.    SHx: , no IVDA, tobacco, ETOh    All: PCN    MEDS: see MR    Vital Signs Last 24 Hrs  T(C): 36.8 (2021 08:54), Max: 36.8 (2021 14:16)  T(F): 98.2 (2021 08:54), Max: 98.3 (2021 14:16)  HR: 73 (2021 10:00) (73 - 100)  BP: 106/67 (2021 10:00) (83/68 - 122/79)  BP(mean): 79 (2021 10:00) (74 - 93)  RR: 8 (2021 10:00) (8 - 20)  SpO2: 100% (2021 10:00) (95% - 100%)  I&O's Detail    2021 07:01  -  2021 07:00  --------------------------------------------------------  IN:    IV PiggyBack: 250 mL    Other (mL): 1400 mL    sodium chloride 0.9%: 1800 mL  Total IN: 3450 mL    OUT:    Bulb (mL): 85 mL    Other (mL): 600 mL    Voided (mL): 1400 mL  Total OUT: 2085 mL    Total NET: 1365 mL    CAPILLARY BLOOD GLUCOSE    POCT Blood Glucose.: 111 mg/dL (2021 08:55)  POCT Blood Glucose.: 146 mg/dL (2021 23:18)    MEDICATIONS  (STANDING):  acetaminophen     Tablet .. 650 milliGRAM(s) Oral every 6 hours  celecoxib 200 milliGRAM(s) Oral daily  cyclobenzaprine 10 milliGRAM(s) Oral every 8 hours  fluticasone propionate (50 MICROgram(s)/actuation) Nasal Spray - Peds 1 Spray(s) Alternating Nostrils daily  HYDROmorphone PCA (1 mG/mL) 30 milliLiter(s) PCA Continuous PCA Continuous  influenza   Vaccine 0.5 milliLiter(s) IntraMuscular once  loratadine 10 milliGRAM(s) Oral daily  montelukast 10 milliGRAM(s) Oral at bedtime  multivitamin 1 Tablet(s) Oral daily  pantoprazole    Tablet 40 milliGRAM(s) Oral before breakfast  sodium chloride 0.9%. 1000 milliLiter(s) (100 mL/Hr) IV Continuous <Continuous>    MEDICATIONS  (PRN):  HYDROmorphone   Tablet 2 milliGRAM(s) Oral every 3 hours PRN Severe Pain (7 - 10)  HYDROmorphone PCA (1 mG/mL) Rescue Clinician Bolus 0.5 milliGRAM(s) IV Push every 15 minutes PRN for Pain Scale GREATER THAN 6  naloxone Injectable 0.1 milliGRAM(s) IV Push every 3 minutes PRN For ANY of the following changes in patient status:  A. RR LESS THAN 10 breaths per minute, B. Oxygen saturation LESS THAN 90%, C. Sedation score of 6  ondansetron Injectable 4 milliGRAM(s) IV Push every 6 hours PRN Nausea    RADIOLOGY; personally visualized    PHYSICAL EXAM:    General: female in no acute distress  Eyes: PERRLA, EOMI; conjunctiva and sclera clear  Head: Normocephalic; atraumatic  ENMT: No nasal discharge; airway clear  Neck: Supple; non tender; no masses, C-collar in place, anterior neck postop area in dressing with DANILO drain in place  Respiratory: No wheezes, rales or rhonchi  Cardiovascular: S1, S2 reg  Gastrointestinal: Soft non-tender non-distended; Normal bowel sounds  Genitourinary: No costovertebral angle tenderness  Extremities: No clubbing, cyanosis or edema  Vascular: Peripheral pulses palpable 2+ bilaterally  Neurological: Alert and oriented x4  Skin: Warm and dry.  Musculoskeletal: Normal tone, without deformities  Psychiatric: Cooperative and appropriate

## 2021-11-19 NOTE — PHYSICAL THERAPY INITIAL EVALUATION ADULT - GAIT DEVIATIONS NOTED, PT EVAL
slow, sluggish, c/o LLE feeling heavier vs R-endorses L femoral n. damage but reports only occ perceives issue w/ walking, tends to feel burning sensation HS

## 2021-11-19 NOTE — PHYSICAL THERAPY INITIAL EVALUATION ADULT - PRECAUTIONS/LIMITATIONS, REHAB EVAL
oob walking w/ soft collar/spinal precautions oob walking w/ soft collar/fall precautions/spinal precautions

## 2021-11-19 NOTE — CONSULT NOTE ADULT - ASSESSMENT
56 y.o. female with PMHx of COVID 19 infection complicated by pericarditis, hemorrhagic CVA, GERD, JORGE s/p elective C-spine ACDF   - pain management, PT, dilaudid PCA    GERD - PPI    Possible Asthma on monteleukast and fluticasone    VTE proph - IPCs    F/u labs in am - none done today.    Time spent 56 min  Discussed with SICU team    Thank you for courtesy of consult.  Will follow along.

## 2021-11-19 NOTE — PHYSICAL THERAPY INITIAL EVALUATION ADULT - GAIT DISTANCE, PT EVAL
few steps w/o AD-unsteady, c/o dizziness, returned to sitting, sipping water, VSS, then amb to bathroom ~15 ft w/ RW and CGA , ~80 ft w/ RW w/ CG

## 2021-11-19 NOTE — PHYSICAL THERAPY INITIAL EVALUATION ADULT - ACTIVE RANGE OF MOTION EXAMINATION, REHAB EVAL
B shld elev tested to <90 deg, L hip flex ltd -reports h/o L femoral n. damage/bilateral upper extremity Active ROM was WFL (within functional limits)/bilateral  lower extremity Active ROM was WFL (within functional limits)

## 2021-11-19 NOTE — PHYSICAL THERAPY INITIAL EVALUATION ADULT - GENERAL OBSERVATIONS, REHAB EVAL
pt rec'd supine in bed, CBIR, PCA, soft collar at bedside -applied for OOB/amb, DANILO, monitors, SCDs, O2, primafit . pt c/o being very tired d/t lack of sleep last night, agreeable to amb/PT, wants to use bathroom. seems little irritable.

## 2021-11-20 ENCOUNTER — TRANSCRIPTION ENCOUNTER (OUTPATIENT)
Age: 56
End: 2021-11-20

## 2021-11-20 VITALS
HEART RATE: 86 BPM | SYSTOLIC BLOOD PRESSURE: 110 MMHG | OXYGEN SATURATION: 100 % | RESPIRATION RATE: 18 BRPM | DIASTOLIC BLOOD PRESSURE: 62 MMHG

## 2021-11-20 LAB
ANION GAP SERPL CALC-SCNC: 3 MMOL/L — LOW (ref 5–17)
BUN SERPL-MCNC: 11 MG/DL — SIGNIFICANT CHANGE UP (ref 7–23)
CALCIUM SERPL-MCNC: 8.2 MG/DL — LOW (ref 8.5–10.1)
CHLORIDE SERPL-SCNC: 106 MMOL/L — SIGNIFICANT CHANGE UP (ref 96–108)
CO2 SERPL-SCNC: 30 MMOL/L — SIGNIFICANT CHANGE UP (ref 22–31)
CREAT SERPL-MCNC: 0.75 MG/DL — SIGNIFICANT CHANGE UP (ref 0.5–1.3)
GLUCOSE SERPL-MCNC: 95 MG/DL — SIGNIFICANT CHANGE UP (ref 70–99)
HCT VFR BLD CALC: 38.1 % — SIGNIFICANT CHANGE UP (ref 34.5–45)
HGB BLD-MCNC: 12.2 G/DL — SIGNIFICANT CHANGE UP (ref 11.5–15.5)
MCHC RBC-ENTMCNC: 30.7 PG — SIGNIFICANT CHANGE UP (ref 27–34)
MCHC RBC-ENTMCNC: 32 GM/DL — SIGNIFICANT CHANGE UP (ref 32–36)
MCV RBC AUTO: 96 FL — SIGNIFICANT CHANGE UP (ref 80–100)
PLATELET # BLD AUTO: 184 K/UL — SIGNIFICANT CHANGE UP (ref 150–400)
POTASSIUM SERPL-MCNC: 4.3 MMOL/L — SIGNIFICANT CHANGE UP (ref 3.5–5.3)
POTASSIUM SERPL-SCNC: 4.3 MMOL/L — SIGNIFICANT CHANGE UP (ref 3.5–5.3)
RBC # BLD: 3.97 M/UL — SIGNIFICANT CHANGE UP (ref 3.8–5.2)
RBC # FLD: 12.8 % — SIGNIFICANT CHANGE UP (ref 10.3–14.5)
SODIUM SERPL-SCNC: 139 MMOL/L — SIGNIFICANT CHANGE UP (ref 135–145)
WBC # BLD: 9.34 K/UL — SIGNIFICANT CHANGE UP (ref 3.8–10.5)
WBC # FLD AUTO: 9.34 K/UL — SIGNIFICANT CHANGE UP (ref 3.8–10.5)

## 2021-11-20 RX ORDER — CYCLOBENZAPRINE HYDROCHLORIDE 10 MG/1
1 TABLET, FILM COATED ORAL
Qty: 0 | Refills: 0 | DISCHARGE

## 2021-11-20 RX ORDER — BENZOCAINE AND MENTHOL 5; 1 G/100ML; G/100ML
1 LIQUID ORAL EVERY 6 HOURS
Refills: 0 | Status: DISCONTINUED | OUTPATIENT
Start: 2021-11-20 | End: 2021-11-20

## 2021-11-20 RX ADMIN — Medication 0.25 MILLIGRAM(S): at 01:07

## 2021-11-20 RX ADMIN — PANTOPRAZOLE SODIUM 40 MILLIGRAM(S): 20 TABLET, DELAYED RELEASE ORAL at 05:27

## 2021-11-20 RX ADMIN — Medication 650 MILLIGRAM(S): at 07:55

## 2021-11-20 RX ADMIN — Medication 1 TABLET(S): at 10:01

## 2021-11-20 RX ADMIN — BENZOCAINE AND MENTHOL 1 LOZENGE: 5; 1 LIQUID ORAL at 07:55

## 2021-11-20 RX ADMIN — SODIUM CHLORIDE 100 MILLILITER(S): 9 INJECTION INTRAMUSCULAR; INTRAVENOUS; SUBCUTANEOUS at 05:27

## 2021-11-20 RX ADMIN — Medication 650 MILLIGRAM(S): at 12:10

## 2021-11-20 RX ADMIN — Medication 650 MILLIGRAM(S): at 07:58

## 2021-11-20 RX ADMIN — Medication 1 SPRAY(S): at 10:01

## 2021-11-20 RX ADMIN — CELECOXIB 200 MILLIGRAM(S): 200 CAPSULE ORAL at 10:00

## 2021-11-20 RX ADMIN — OXYCODONE HYDROCHLORIDE 5 MILLIGRAM(S): 5 TABLET ORAL at 08:42

## 2021-11-20 RX ADMIN — OXYCODONE HYDROCHLORIDE 5 MILLIGRAM(S): 5 TABLET ORAL at 09:30

## 2021-11-20 RX ADMIN — BENZOCAINE AND MENTHOL 1 LOZENGE: 5; 1 LIQUID ORAL at 12:10

## 2021-11-20 RX ADMIN — INFLUENZA VIRUS VACCINE 0.5 MILLILITER(S): 15; 15; 15; 15 SUSPENSION INTRAMUSCULAR at 12:10

## 2021-11-20 RX ADMIN — CYCLOBENZAPRINE HYDROCHLORIDE 10 MILLIGRAM(S): 10 TABLET, FILM COATED ORAL at 05:27

## 2021-11-20 RX ADMIN — CELECOXIB 200 MILLIGRAM(S): 200 CAPSULE ORAL at 11:00

## 2021-11-20 NOTE — PROGRESS NOTE ADULT - ASSESSMENT
s/p ACDF C45,C67 expl fusion C56    stable  doing well but very anxious
s/p ACDF x 2  non contiguous levels

## 2021-11-20 NOTE — PROGRESS NOTE ADULT - PROBLEM SELECTOR PLAN 1
oob  doing well  ok to go home today w drain  instructions given to pt  will see in f/u this week in office to remove drain
stable post op   needs PT  gave xanax for anxiety  mobilize

## 2021-11-20 NOTE — DISCHARGE NOTE PROVIDER - NSDCCPCAREPLAN_GEN_ALL_CORE_FT
PRINCIPAL DISCHARGE DIAGNOSIS  Diagnosis: Cervical herniated disc  Assessment and Plan of Treatment:       SECONDARY DISCHARGE DIAGNOSES  Diagnosis: Cervical postlaminectomy syndrome  Assessment and Plan of Treatment:

## 2021-11-20 NOTE — PROGRESS NOTE ADULT - SUBJECTIVE AND OBJECTIVE BOX
POD#2  shoulders neck feel much better  nv intact  afeb vss  Drain20 cc last shift  left in  dressing intact
                                                           [facility]                                                            Orthopedic Spine Progress Note    Date of Service 11-19-21 @ 19:51    POST OPERATIVE DAY #: 1  STATUS POST:    ACDF C45,C67 expl fusion C56             Pre-Op Dx: Herniated cervical intervertebral disc      Post-Op Dx:  Cervical herniated disc    Cervical herniated disc      Prin. Procedure:  ACDF C45,C67 expl fusion C56      SUBJECTIVE: Patient seen and examined.   quite anxious   but neck pain improved from preop status    Pain (0-10): 4  Current Pain Management:  [ ] PCA   [x ] Po Analgesics [ ] IM /IV Anagesics     Vital Signs Last 24 Hrs  T(C): 36.8 (19 Nov 2021 16:25), Max: 36.9 (19 Nov 2021 13:18)  T(F): 98.2 (19 Nov 2021 16:25), Max: 98.5 (19 Nov 2021 13:18)  HR: 93 (19 Nov 2021 16:00) (72 - 97)  BP: 108/84 (19 Nov 2021 16:00) (83/68 - 122/79)  BP(mean): 91 (19 Nov 2021 16:00) (74 - 91)  RR: 22 (19 Nov 2021 16:00) (8 - 22)  SpO2: 96% (19 Nov 2021 16:00) (95% - 100%)  I&O's Detail    18 Nov 2021 07:01  -  19 Nov 2021 07:00  --------------------------------------------------------  IN:    IV PiggyBack: 250 mL    Other (mL): 1400 mL    sodium chloride 0.9%: 1800 mL  Total IN: 3450 mL    OUT:    Bulb (mL): 85 mL........20 cc last shift left in    Other (mL): 600 mL    Voided (mL): 1400 mL  Total OUT: 2085 mL    Total NET: 1365 mL      19 Nov 2021 07:01  -  19 Nov 2021 19:51  --------------------------------------------------------  IN:  Total IN: 0 mL    OUT:    Bulb (mL): 20 mL....left in    Voided (mL): 700 mL  Total OUT: 720 mL    Total NET: -720 mL          OBJECTIVE: anxious but otherwise ok        Wound /Dressing: clean and dry  Cervical ROM:better but with less pain    Neurological: A/O x  3           Sensation: [x ] intact to light touch  [ ] decreased:          Motor exam: [ x ]          [x ] Upper extremity    Delt      Bicp       Tri      Wrist ext  Wrst Flex       Digit Ext Digit Flex                                     R         5/5        5/5        5/5       5/5            5/5            5/5       5/5          5/5                                     L          5/5        5/5        5/5       5/5            5/5            5/5       5/5          5/5         [ x] Lower ext.     Hip Flx  Hip Ext   Hip Abd  Hip Add Quad   Hamstrg   TA       EHL      GS                              R        5/5        5/5        5/5             5/5        5/5        5/5        5/5     5/5      5/5                              L         5/5        5/5        5/5             5/5        5/5        5/5        5/5     5/5      5/5                                                        [x ] Vascular :  wnl           Tension Signs: neg          Long Tract Findings: neg    RADIOLOGY & ADDITIONAL STUDIES:                                               LABS:    not indicated            A/P :  56y Female   s/p:  ACDF C45,C67 expl fusion C56    -    Pain control  -    DVT ppx: SCDs    -    Abx:  completed  -    Review labs as indicated     -    Physical Therapy...oob walking w brace  -    Weight bearing status: full  -    Dispo: Home [x ]     Rehab [ ]

## 2021-11-20 NOTE — DISCHARGE NOTE PROVIDER - CARE PROVIDER_API CALL
Jacky Perez)  Orthopaedic Surgery  [address] [place] [address]  Phone: [phone]  Fax: [phone]  Follow Up Time:

## 2021-11-20 NOTE — DISCHARGE NOTE NURSING/CASE MANAGEMENT/SOCIAL WORK - PATIENT PORTAL LINK FT
You can access the FollowMyHealth Patient Portal offered by [facility] by registering at the following website: [email or web address]. By joining [employer]’s FollowMyHealth portal, you will also be able to view your health information using other applications (apps) compatible with our system.

## 2021-11-20 NOTE — DISCHARGE NOTE PROVIDER - NSDCCPTREATMENT_GEN_ALL_CORE_FT
PRINCIPAL PROCEDURE  Procedure: Fusion of anterior and posterior columns of cervical spine  Findings and Treatment:

## 2021-11-20 NOTE — DISCHARGE NOTE PROVIDER - NSDCMRMEDTOKEN_GEN_ALL_CORE_FT
Allegra 24 Hour Allergy oral tablet: 1 tab(s) orally once a day  azelastine 137 mcg/inh (0.1%) nasal spray: 1  nasal 2 times a day  Flonase 50 mcg/inh nasal spray: 1 spray(s) nasal once a day  montelukast 10 mg oral tablet: 1 tab(s) orally once a day  omeprazole 40 mg oral delayed release capsule: 1 cap(s) orally once a day  traMADol 50 mg oral tablet: 1 tab(s) orally every 6 hours, As Needed

## 2021-11-20 NOTE — DISCHARGE NOTE PROVIDER - NPI NUMBER (FOR SYSADMIN USE ONLY) :
clinic: As scheduled    [[id number]]

## 2021-11-21 NOTE — DISCHARGE NOTE PROVIDER - REASON FOR ADMISSION
HNP cervical/Postlami syndrome cervical/DDD cervical
[FreeTextEntry1] : feels good. had covid but fully recoverd

## 2021-11-26 DIAGNOSIS — F41.9 ANXIETY DISORDER, UNSPECIFIED: ICD-10-CM

## 2021-11-26 DIAGNOSIS — J30.9 ALLERGIC RHINITIS, UNSPECIFIED: ICD-10-CM
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
## 2021-11-26 DIAGNOSIS — Z86.16 PERSONAL HISTORY OF COVID-19: ICD-10-CM

## 2021-11-26 DIAGNOSIS — S13.150A: ICD-10-CM

## 2021-11-26 DIAGNOSIS — R13.10 DYSPHAGIA, UNSPECIFIED: ICD-10-CM

## 2021-11-26 DIAGNOSIS — Z98.1 ARTHRODESIS STATUS: ICD-10-CM

## 2021-11-26 DIAGNOSIS — Z88.0 ALLERGY STATUS TO PENICILLIN: ICD-10-CM
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
## 2021-11-26 DIAGNOSIS — F17.210 NICOTINE DEPENDENCE, CIGARETTES, UNCOMPLICATED: ICD-10-CM

## 2021-11-26 DIAGNOSIS — K21.9 GASTRO-ESOPHAGEAL REFLUX DISEASE WITHOUT ESOPHAGITIS: ICD-10-CM
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
## 2021-11-26 DIAGNOSIS — Z86.73 PERSONAL HISTORY OF TRANSIENT ISCHEMIC ATTACK (TIA), AND CEREBRAL INFARCTION WITHOUT RESIDUAL DEFICITS: ICD-10-CM

## 2021-11-26 DIAGNOSIS — M25.78 OSTEOPHYTE, VERTEBRAE: ICD-10-CM

## 2021-11-26 DIAGNOSIS — G47.33 OBSTRUCTIVE SLEEP APNEA (ADULT) (PEDIATRIC): ICD-10-CM

## 2021-11-26 DIAGNOSIS — M96.1 POSTLAMINECTOMY SYNDROME, NOT ELSEWHERE CLASSIFIED: ICD-10-CM

## 2021-11-26 DIAGNOSIS — M50.221 OTHER CERVICAL DISC DISPLACEMENT AT C4-C5 LEVEL: ICD-10-CM

## 2022-02-17 PROBLEM — M50.20 OTHER CERVICAL DISC DISPLACEMENT, UNSPECIFIED CERVICAL REGION: Chronic | Status: ACTIVE | Noted: 2021-11-10

## 2022-02-17 PROBLEM — Z92.29 PERSONAL HISTORY OF OTHER DRUG THERAPY: Chronic | Status: ACTIVE | Noted: 2021-11-10

## 2022-02-17 PROBLEM — U07.1 COVID-19: Chronic | Status: ACTIVE | Noted: 2021-11-10

## 2022-02-17 PROBLEM — G47.30 SLEEP APNEA, UNSPECIFIED: Chronic | Status: ACTIVE | Noted: 2021-11-10

## 2022-02-17 PROBLEM — K21.9 GASTRO-ESOPHAGEAL REFLUX DISEASE WITHOUT ESOPHAGITIS: Chronic | Status: ACTIVE | Noted: 2021-11-10

## 2022-02-17 PROBLEM — M50.90 CERVICAL DISC DISORDER, UNSPECIFIED, UNSPECIFIED CERVICAL REGION: Chronic | Status: ACTIVE | Noted: 2021-11-10

## 2022-02-17 PROBLEM — J30.9 ALLERGIC RHINITIS, UNSPECIFIED: Chronic | Status: ACTIVE | Noted: 2021-11-10

## 2022-02-17 PROBLEM — Z87.19 PERSONAL HISTORY OF OTHER DISEASES OF THE DIGESTIVE SYSTEM: Chronic | Status: ACTIVE | Noted: 2021-11-10

## 2022-02-22 ENCOUNTER — INPATIENT (INPATIENT)
Facility: HOSPITAL | Age: 57
LOS: 0 days | Discharge: HOME | DRG: 850 | End: 2022-02-23
Attending: ORTHOPAEDIC SURGERY | Admitting: ORTHOPAEDIC SURGERY
Payer: OTHER MISCELLANEOUS

## 2022-02-22 VITALS
TEMPERATURE: 97 F | HEART RATE: 84 BPM | WEIGHT: 154.98 LBS | HEIGHT: 67 IN | RESPIRATION RATE: 15 BRPM | DIASTOLIC BLOOD PRESSURE: 65 MMHG | SYSTOLIC BLOOD PRESSURE: 98 MMHG | OXYGEN SATURATION: 100 %

## 2022-02-22 DIAGNOSIS — Z90.89 ACQUIRED ABSENCE OF OTHER ORGANS: Chronic | ICD-10-CM

## 2022-02-22 DIAGNOSIS — M43.22 FUSION OF SPINE, CERVICAL REGION: Chronic | ICD-10-CM

## 2022-02-22 DIAGNOSIS — Z98.890 OTHER SPECIFIED POSTPROCEDURAL STATES: Chronic | ICD-10-CM

## 2022-02-22 DIAGNOSIS — Z90.49 ACQUIRED ABSENCE OF OTHER SPECIFIED PARTS OF DIGESTIVE TRACT: Chronic | ICD-10-CM

## 2022-02-22 DIAGNOSIS — Z98.891 HISTORY OF UTERINE SCAR FROM PREVIOUS SURGERY: Chronic | ICD-10-CM

## 2022-02-22 DIAGNOSIS — S12.501A UNSPECIFIED NONDISPLACED FRACTURE OF SIXTH CERVICAL VERTEBRA, INITIAL ENCOUNTER FOR CLOSED FRACTURE: ICD-10-CM

## 2022-02-22 LAB
ANION GAP SERPL CALC-SCNC: 4 MMOL/L — LOW (ref 5–17)
ANION GAP SERPL CALC-SCNC: 5 MMOL/L — SIGNIFICANT CHANGE UP (ref 5–17)
APPEARANCE UR: CLEAR — SIGNIFICANT CHANGE UP
APTT BLD: 32.2 SEC — SIGNIFICANT CHANGE UP (ref 27.5–35.5)
BASOPHILS # BLD AUTO: 0.02 K/UL — SIGNIFICANT CHANGE UP (ref 0–0.2)
BASOPHILS NFR BLD AUTO: 0.2 % — SIGNIFICANT CHANGE UP (ref 0–2)
BILIRUB UR-MCNC: NEGATIVE — SIGNIFICANT CHANGE UP
BUN SERPL-MCNC: 14 MG/DL — SIGNIFICANT CHANGE UP (ref 7–23)
BUN SERPL-MCNC: 17 MG/DL — SIGNIFICANT CHANGE UP (ref 7–23)
CALCIUM SERPL-MCNC: 9 MG/DL — SIGNIFICANT CHANGE UP (ref 8.5–10.1)
CALCIUM SERPL-MCNC: 9.2 MG/DL — SIGNIFICANT CHANGE UP (ref 8.5–10.1)
CHLORIDE SERPL-SCNC: 108 MMOL/L — SIGNIFICANT CHANGE UP (ref 96–108)
CHLORIDE SERPL-SCNC: 109 MMOL/L — HIGH (ref 96–108)
CO2 SERPL-SCNC: 26 MMOL/L — SIGNIFICANT CHANGE UP (ref 22–31)
CO2 SERPL-SCNC: 27 MMOL/L — SIGNIFICANT CHANGE UP (ref 22–31)
COLOR SPEC: YELLOW — SIGNIFICANT CHANGE UP
CREAT SERPL-MCNC: 0.89 MG/DL — SIGNIFICANT CHANGE UP (ref 0.5–1.3)
CREAT SERPL-MCNC: 0.91 MG/DL — SIGNIFICANT CHANGE UP (ref 0.5–1.3)
DIFF PNL FLD: ABNORMAL
EOSINOPHIL # BLD AUTO: 0 K/UL — SIGNIFICANT CHANGE UP (ref 0–0.5)
EOSINOPHIL NFR BLD AUTO: 0 % — SIGNIFICANT CHANGE UP (ref 0–6)
GLUCOSE SERPL-MCNC: 134 MG/DL — HIGH (ref 70–99)
GLUCOSE SERPL-MCNC: 93 MG/DL — SIGNIFICANT CHANGE UP (ref 70–99)
GLUCOSE UR QL: NEGATIVE — SIGNIFICANT CHANGE UP
HCT VFR BLD CALC: 35.3 % — SIGNIFICANT CHANGE UP (ref 34.5–45)
HCT VFR BLD CALC: 37.9 % — SIGNIFICANT CHANGE UP (ref 34.5–45)
HGB BLD-MCNC: 12 G/DL — SIGNIFICANT CHANGE UP (ref 11.5–15.5)
HGB BLD-MCNC: 13.2 G/DL — SIGNIFICANT CHANGE UP (ref 11.5–15.5)
IMM GRANULOCYTES NFR BLD AUTO: 0.7 % — SIGNIFICANT CHANGE UP (ref 0–1.5)
INR BLD: 0.91 RATIO — SIGNIFICANT CHANGE UP (ref 0.88–1.16)
KETONES UR-MCNC: ABNORMAL
LEUKOCYTE ESTERASE UR-ACNC: ABNORMAL
LYMPHOCYTES # BLD AUTO: 1.15 K/UL — SIGNIFICANT CHANGE UP (ref 1–3.3)
LYMPHOCYTES # BLD AUTO: 10.7 % — LOW (ref 13–44)
MCHC RBC-ENTMCNC: 30.8 PG — SIGNIFICANT CHANGE UP (ref 27–34)
MCHC RBC-ENTMCNC: 31.3 PG — SIGNIFICANT CHANGE UP (ref 27–34)
MCHC RBC-ENTMCNC: 34 GM/DL — SIGNIFICANT CHANGE UP (ref 32–36)
MCHC RBC-ENTMCNC: 34.8 GM/DL — SIGNIFICANT CHANGE UP (ref 32–36)
MCV RBC AUTO: 89.8 FL — SIGNIFICANT CHANGE UP (ref 80–100)
MCV RBC AUTO: 90.7 FL — SIGNIFICANT CHANGE UP (ref 80–100)
MONOCYTES # BLD AUTO: 0.08 K/UL — SIGNIFICANT CHANGE UP (ref 0–0.9)
MONOCYTES NFR BLD AUTO: 0.7 % — LOW (ref 2–14)
NEUTROPHILS # BLD AUTO: 9.37 K/UL — HIGH (ref 1.8–7.4)
NEUTROPHILS NFR BLD AUTO: 87.7 % — HIGH (ref 43–77)
NITRITE UR-MCNC: NEGATIVE — SIGNIFICANT CHANGE UP
PH UR: 5 — SIGNIFICANT CHANGE UP (ref 5–8)
PLATELET # BLD AUTO: 200 K/UL — SIGNIFICANT CHANGE UP (ref 150–400)
PLATELET # BLD AUTO: 239 K/UL — SIGNIFICANT CHANGE UP (ref 150–400)
POTASSIUM SERPL-MCNC: 3.8 MMOL/L — SIGNIFICANT CHANGE UP (ref 3.5–5.3)
POTASSIUM SERPL-MCNC: 4.1 MMOL/L — SIGNIFICANT CHANGE UP (ref 3.5–5.3)
POTASSIUM SERPL-SCNC: 3.8 MMOL/L — SIGNIFICANT CHANGE UP (ref 3.5–5.3)
POTASSIUM SERPL-SCNC: 4.1 MMOL/L — SIGNIFICANT CHANGE UP (ref 3.5–5.3)
PROT UR-MCNC: NEGATIVE — SIGNIFICANT CHANGE UP
PROTHROM AB SERPL-ACNC: 10.7 SEC — SIGNIFICANT CHANGE UP (ref 10.6–13.6)
RBC # BLD: 3.89 M/UL — SIGNIFICANT CHANGE UP (ref 3.8–5.2)
RBC # BLD: 4.22 M/UL — SIGNIFICANT CHANGE UP (ref 3.8–5.2)
RBC # FLD: 12.3 % — SIGNIFICANT CHANGE UP (ref 10.3–14.5)
RBC # FLD: 12.3 % — SIGNIFICANT CHANGE UP (ref 10.3–14.5)
SODIUM SERPL-SCNC: 139 MMOL/L — SIGNIFICANT CHANGE UP (ref 135–145)
SODIUM SERPL-SCNC: 140 MMOL/L — SIGNIFICANT CHANGE UP (ref 135–145)
SP GR SPEC: 1.03 — HIGH (ref 1.01–1.02)
UROBILINOGEN FLD QL: NEGATIVE — SIGNIFICANT CHANGE UP
WBC # BLD: 10.7 K/UL — HIGH (ref 3.8–10.5)
WBC # BLD: 6.44 K/UL — SIGNIFICANT CHANGE UP (ref 3.8–10.5)
WBC # FLD AUTO: 10.7 K/UL — HIGH (ref 3.8–10.5)
WBC # FLD AUTO: 6.44 K/UL — SIGNIFICANT CHANGE UP (ref 3.8–10.5)

## 2022-02-22 PROCEDURE — 86850 RBC ANTIBODY SCREEN: CPT

## 2022-02-22 PROCEDURE — 85027 COMPLETE CBC AUTOMATED: CPT

## 2022-02-22 PROCEDURE — 82962 GLUCOSE BLOOD TEST: CPT

## 2022-02-22 PROCEDURE — 86900 BLOOD TYPING SEROLOGIC ABO: CPT

## 2022-02-22 PROCEDURE — 76000 FLUOROSCOPY <1 HR PHYS/QHP: CPT

## 2022-02-22 PROCEDURE — 72100 X-RAY EXAM L-S SPINE 2/3 VWS: CPT

## 2022-02-22 PROCEDURE — 86901 BLOOD TYPING SEROLOGIC RH(D): CPT

## 2022-02-22 PROCEDURE — 85730 THROMBOPLASTIN TIME PARTIAL: CPT

## 2022-02-22 PROCEDURE — 72040 X-RAY EXAM NECK SPINE 2-3 VW: CPT

## 2022-02-22 PROCEDURE — 80048 BASIC METABOLIC PNL TOTAL CA: CPT

## 2022-02-22 PROCEDURE — 72040 X-RAY EXAM NECK SPINE 2-3 VW: CPT | Mod: 26

## 2022-02-22 PROCEDURE — 36415 COLL VENOUS BLD VENIPUNCTURE: CPT

## 2022-02-22 PROCEDURE — 85025 COMPLETE CBC W/AUTO DIFF WBC: CPT

## 2022-02-22 PROCEDURE — C1713: CPT

## 2022-02-22 PROCEDURE — 99252 IP/OBS CONSLTJ NEW/EST SF 35: CPT

## 2022-02-22 PROCEDURE — 85610 PROTHROMBIN TIME: CPT

## 2022-02-22 PROCEDURE — C1889: CPT

## 2022-02-22 PROCEDURE — 81001 URINALYSIS AUTO W/SCOPE: CPT

## 2022-02-22 PROCEDURE — C9399: CPT

## 2022-02-22 RX ORDER — FLUTICASONE PROPIONATE 50 MCG
1 SPRAY, SUSPENSION NASAL
Refills: 0 | Status: DISCONTINUED | OUTPATIENT
Start: 2022-02-22 | End: 2022-02-23

## 2022-02-22 RX ORDER — CELECOXIB 200 MG/1
200 CAPSULE ORAL DAILY
Refills: 0 | Status: DISCONTINUED | OUTPATIENT
Start: 2022-02-23 | End: 2022-02-23

## 2022-02-22 RX ORDER — OMEPRAZOLE 10 MG/1
1 CAPSULE, DELAYED RELEASE ORAL
Qty: 0 | Refills: 0 | DISCHARGE

## 2022-02-22 RX ORDER — HYDROMORPHONE HYDROCHLORIDE 2 MG/ML
30 INJECTION INTRAMUSCULAR; INTRAVENOUS; SUBCUTANEOUS
Refills: 0 | Status: DISCONTINUED | OUTPATIENT
Start: 2022-02-22 | End: 2022-02-23

## 2022-02-22 RX ORDER — LORATADINE 10 MG/1
10 TABLET ORAL DAILY
Refills: 0 | Status: DISCONTINUED | OUTPATIENT
Start: 2022-02-22 | End: 2022-02-23

## 2022-02-22 RX ORDER — VANCOMYCIN HCL 1 G
1000 VIAL (EA) INTRAVENOUS EVERY 12 HOURS
Refills: 0 | Status: COMPLETED | OUTPATIENT
Start: 2022-02-22 | End: 2022-02-23

## 2022-02-22 RX ORDER — MONTELUKAST 4 MG/1
1 TABLET, CHEWABLE ORAL
Qty: 0 | Refills: 0 | DISCHARGE

## 2022-02-22 RX ORDER — NALOXONE HYDROCHLORIDE 4 MG/.1ML
0.1 SPRAY NASAL
Refills: 0 | Status: DISCONTINUED | OUTPATIENT
Start: 2022-02-22 | End: 2022-02-23

## 2022-02-22 RX ORDER — SODIUM CHLORIDE 9 MG/ML
1000 INJECTION, SOLUTION INTRAVENOUS
Refills: 0 | Status: DISCONTINUED | OUTPATIENT
Start: 2022-02-22 | End: 2022-02-22

## 2022-02-22 RX ORDER — TRAMADOL HYDROCHLORIDE 50 MG/1
1 TABLET ORAL
Qty: 0 | Refills: 0 | DISCHARGE

## 2022-02-22 RX ORDER — PANTOPRAZOLE SODIUM 20 MG/1
40 TABLET, DELAYED RELEASE ORAL DAILY
Refills: 0 | Status: DISCONTINUED | OUTPATIENT
Start: 2022-02-22 | End: 2022-02-23

## 2022-02-22 RX ORDER — FLUTICASONE PROPIONATE 50 MCG
1 SPRAY, SUSPENSION NASAL DAILY
Refills: 0 | Status: DISCONTINUED | OUTPATIENT
Start: 2022-02-22 | End: 2022-02-22

## 2022-02-22 RX ORDER — FLUTICASONE PROPIONATE 50 MCG
1 SPRAY, SUSPENSION NASAL
Qty: 0 | Refills: 0 | DISCHARGE

## 2022-02-22 RX ORDER — AZELASTINE 137 UG/1
1 SPRAY, METERED NASAL
Qty: 0 | Refills: 0 | DISCHARGE

## 2022-02-22 RX ORDER — ONDANSETRON 8 MG/1
4 TABLET, FILM COATED ORAL ONCE
Refills: 0 | Status: DISCONTINUED | OUTPATIENT
Start: 2022-02-22 | End: 2022-02-22

## 2022-02-22 RX ORDER — FEXOFENADINE HCL 30 MG
1 TABLET ORAL
Qty: 0 | Refills: 0 | DISCHARGE

## 2022-02-22 RX ORDER — OXYCODONE AND ACETAMINOPHEN 5; 325 MG/1; MG/1
2 TABLET ORAL EVERY 4 HOURS
Refills: 0 | Status: DISCONTINUED | OUTPATIENT
Start: 2022-02-22 | End: 2022-02-23

## 2022-02-22 RX ORDER — VANCOMYCIN HCL 1 G
1000 VIAL (EA) INTRAVENOUS ONCE
Refills: 0 | Status: COMPLETED | OUTPATIENT
Start: 2022-02-22 | End: 2022-02-22

## 2022-02-22 RX ORDER — ONDANSETRON 8 MG/1
4 TABLET, FILM COATED ORAL EVERY 6 HOURS
Refills: 0 | Status: DISCONTINUED | OUTPATIENT
Start: 2022-02-22 | End: 2022-02-23

## 2022-02-22 RX ORDER — HYDROMORPHONE HYDROCHLORIDE 2 MG/ML
0.5 INJECTION INTRAMUSCULAR; INTRAVENOUS; SUBCUTANEOUS
Refills: 0 | Status: DISCONTINUED | OUTPATIENT
Start: 2022-02-22 | End: 2022-02-22

## 2022-02-22 RX ORDER — MONTELUKAST 4 MG/1
10 TABLET, CHEWABLE ORAL AT BEDTIME
Refills: 0 | Status: DISCONTINUED | OUTPATIENT
Start: 2022-02-22 | End: 2022-02-23

## 2022-02-22 RX ORDER — CYCLOBENZAPRINE HYDROCHLORIDE 10 MG/1
10 TABLET, FILM COATED ORAL THREE TIMES A DAY
Refills: 0 | Status: DISCONTINUED | OUTPATIENT
Start: 2022-02-22 | End: 2022-02-23

## 2022-02-22 RX ORDER — SODIUM CHLORIDE 9 MG/ML
1000 INJECTION INTRAMUSCULAR; INTRAVENOUS; SUBCUTANEOUS
Refills: 0 | Status: DISCONTINUED | OUTPATIENT
Start: 2022-02-22 | End: 2022-02-23

## 2022-02-22 RX ORDER — ACETAMINOPHEN 500 MG
650 TABLET ORAL EVERY 6 HOURS
Refills: 0 | Status: DISCONTINUED | OUTPATIENT
Start: 2022-02-22 | End: 2022-02-23

## 2022-02-22 RX ADMIN — HYDROMORPHONE HYDROCHLORIDE 0.5 MILLIGRAM(S): 2 INJECTION INTRAMUSCULAR; INTRAVENOUS; SUBCUTANEOUS at 14:45

## 2022-02-22 RX ADMIN — SODIUM CHLORIDE 100 MILLILITER(S): 9 INJECTION INTRAMUSCULAR; INTRAVENOUS; SUBCUTANEOUS at 17:56

## 2022-02-22 RX ADMIN — Medication 250 MILLIGRAM(S): at 22:59

## 2022-02-22 RX ADMIN — Medication 650 MILLIGRAM(S): at 18:24

## 2022-02-22 RX ADMIN — HYDROMORPHONE HYDROCHLORIDE 0.5 MILLIGRAM(S): 2 INJECTION INTRAMUSCULAR; INTRAVENOUS; SUBCUTANEOUS at 14:25

## 2022-02-22 RX ADMIN — SODIUM CHLORIDE 125 MILLILITER(S): 9 INJECTION, SOLUTION INTRAVENOUS at 14:29

## 2022-02-22 RX ADMIN — Medication 650 MILLIGRAM(S): at 23:55

## 2022-02-22 RX ADMIN — HYDROMORPHONE HYDROCHLORIDE 30 MILLILITER(S): 2 INJECTION INTRAMUSCULAR; INTRAVENOUS; SUBCUTANEOUS at 14:59

## 2022-02-22 RX ADMIN — HYDROMORPHONE HYDROCHLORIDE 0.5 MILLIGRAM(S): 2 INJECTION INTRAMUSCULAR; INTRAVENOUS; SUBCUTANEOUS at 14:28

## 2022-02-22 RX ADMIN — Medication 650 MILLIGRAM(S): at 23:59

## 2022-02-22 RX ADMIN — HYDROMORPHONE HYDROCHLORIDE 0.5 MILLIGRAM(S): 2 INJECTION INTRAMUSCULAR; INTRAVENOUS; SUBCUTANEOUS at 14:15

## 2022-02-22 RX ADMIN — Medication 650 MILLIGRAM(S): at 19:05

## 2022-02-22 RX ADMIN — MONTELUKAST 10 MILLIGRAM(S): 4 TABLET, CHEWABLE ORAL at 23:06

## 2022-02-22 RX ADMIN — Medication 250 MILLIGRAM(S): at 08:06

## 2022-02-22 RX ADMIN — SODIUM CHLORIDE 100 MILLILITER(S): 9 INJECTION INTRAMUSCULAR; INTRAVENOUS; SUBCUTANEOUS at 22:59

## 2022-02-22 NOTE — PHYSICAL THERAPY INITIAL EVALUATION ADULT - PERTINENT HX OF CURRENT PROBLEM, REHAB EVAL
cervical post-laminectomy syndrome /cervical pseudarthrosis; h/o C/S HNP and prior vertebral fusion 1992

## 2022-02-22 NOTE — PATIENT PROFILE ADULT - ARE YOU PART OF A CLINICAL TRIAL?
Quality 111:Pneumonia Vaccination Status For Older Adults: Pneumococcal Vaccination Previously Received
Detail Level: Detailed
Quality 110: Preventive Care And Screening: Influenza Immunization: Influenza Immunization previously received during influenza season
Quality 400a: One-Time Screening For Hepatitis C Virus (Hcv) For All Patients: Patient received one-time screening for HCV infection
no

## 2022-02-22 NOTE — CONSULT NOTE ADULT - ASSESSMENT
55 y/o F presents for laminectomy and c-spine fusion     1. POD#0 laminectomy with fusion of the cervical spine using instrumentation and bone graft by posterior approach for foraminal stenosis   - Admitted to SICU/orthopedics service   - f/u XR cervical spine official read  - c/w NS at 100 ml/hr   - Pain control with Tylenol, Celebrex, Dilaudid PCA   - c/w clear liquid diet; advance as tolerated   - Encourage OOB to chair, ICS use   - Non-weight bearing   - SCDs; Hold pharmacological DVT ppx (will discuss with ortho when to restart)    2. Leukocytosis   - WBC 10.70, trend     3. Hyperglycemia   - Glucose 134, monitor     4. Asymptomatic bacteruria   - UA: trace leukocyte esterase, few bacteria   - f/u UCx, if positive will treat with antibiotics     5. History of COVID 19, allergic sinusitis, cervical disc disease/herniation s/p cervical fusion 1992, GERD, colitis, sleep apnea (no CPAP or oral appliance)   - c/w home medications     DVT ppx: SCDs

## 2022-02-22 NOTE — PATIENT PROFILE ADULT - FALL HARM RISK - RISK INTERVENTIONS
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
## 2022-02-22 NOTE — BRIEF OPERATIVE NOTE - NSICDXBRIEFPROCEDURE_GEN_ALL_CORE_FT
PROCEDURES:  Laminectomy with fusion of cervical spine using instrumentation and bone graft by posterior approach 22-Feb-2022 13:36:58  Jakcy Perez

## 2022-02-23 ENCOUNTER — TRANSCRIPTION ENCOUNTER (OUTPATIENT)
Age: 57
End: 2022-02-23

## 2022-02-23 VITALS
RESPIRATION RATE: 19 BRPM | OXYGEN SATURATION: 95 % | DIASTOLIC BLOOD PRESSURE: 61 MMHG | SYSTOLIC BLOOD PRESSURE: 116 MMHG | HEART RATE: 96 BPM

## 2022-02-23 DIAGNOSIS — M96.1 POSTLAMINECTOMY SYNDROME, NOT ELSEWHERE CLASSIFIED: ICD-10-CM

## 2022-02-23 LAB
ANION GAP SERPL CALC-SCNC: 3 MMOL/L — LOW (ref 5–17)
BASOPHILS # BLD AUTO: 0.02 K/UL — SIGNIFICANT CHANGE UP (ref 0–0.2)
BASOPHILS NFR BLD AUTO: 0.1 % — SIGNIFICANT CHANGE UP (ref 0–2)
BUN SERPL-MCNC: 9 MG/DL — SIGNIFICANT CHANGE UP (ref 7–23)
CALCIUM SERPL-MCNC: 8.7 MG/DL — SIGNIFICANT CHANGE UP (ref 8.5–10.1)
CHLORIDE SERPL-SCNC: 109 MMOL/L — HIGH (ref 96–108)
CO2 SERPL-SCNC: 28 MMOL/L — SIGNIFICANT CHANGE UP (ref 22–31)
CREAT SERPL-MCNC: 0.71 MG/DL — SIGNIFICANT CHANGE UP (ref 0.5–1.3)
EOSINOPHIL # BLD AUTO: 0 K/UL — SIGNIFICANT CHANGE UP (ref 0–0.5)
EOSINOPHIL NFR BLD AUTO: 0 % — SIGNIFICANT CHANGE UP (ref 0–6)
GLUCOSE SERPL-MCNC: 116 MG/DL — HIGH (ref 70–99)
HCT VFR BLD CALC: 33 % — LOW (ref 34.5–45)
HGB BLD-MCNC: 11.1 G/DL — LOW (ref 11.5–15.5)
IMM GRANULOCYTES NFR BLD AUTO: 0.6 % — SIGNIFICANT CHANGE UP (ref 0–1.5)
LYMPHOCYTES # BLD AUTO: 15.9 % — SIGNIFICANT CHANGE UP (ref 13–44)
LYMPHOCYTES # BLD AUTO: 2.24 K/UL — SIGNIFICANT CHANGE UP (ref 1–3.3)
MCHC RBC-ENTMCNC: 30.7 PG — SIGNIFICANT CHANGE UP (ref 27–34)
MCHC RBC-ENTMCNC: 33.6 GM/DL — SIGNIFICANT CHANGE UP (ref 32–36)
MCV RBC AUTO: 91.4 FL — SIGNIFICANT CHANGE UP (ref 80–100)
MONOCYTES # BLD AUTO: 0.71 K/UL — SIGNIFICANT CHANGE UP (ref 0–0.9)
MONOCYTES NFR BLD AUTO: 5.1 % — SIGNIFICANT CHANGE UP (ref 2–14)
NEUTROPHILS # BLD AUTO: 10.99 K/UL — HIGH (ref 1.8–7.4)
NEUTROPHILS NFR BLD AUTO: 78.3 % — HIGH (ref 43–77)
PLATELET # BLD AUTO: 210 K/UL — SIGNIFICANT CHANGE UP (ref 150–400)
POTASSIUM SERPL-MCNC: 4.1 MMOL/L — SIGNIFICANT CHANGE UP (ref 3.5–5.3)
POTASSIUM SERPL-SCNC: 4.1 MMOL/L — SIGNIFICANT CHANGE UP (ref 3.5–5.3)
RBC # BLD: 3.61 M/UL — LOW (ref 3.8–5.2)
RBC # FLD: 12.2 % — SIGNIFICANT CHANGE UP (ref 10.3–14.5)
SODIUM SERPL-SCNC: 140 MMOL/L — SIGNIFICANT CHANGE UP (ref 135–145)
WBC # BLD: 14.05 K/UL — HIGH (ref 3.8–10.5)
WBC # FLD AUTO: 14.05 K/UL — HIGH (ref 3.8–10.5)

## 2022-02-23 RX ADMIN — Medication 250 MILLIGRAM(S): at 10:57

## 2022-02-23 RX ADMIN — ONDANSETRON 4 MILLIGRAM(S): 8 TABLET, FILM COATED ORAL at 10:36

## 2022-02-23 RX ADMIN — PANTOPRAZOLE SODIUM 40 MILLIGRAM(S): 20 TABLET, DELAYED RELEASE ORAL at 10:56

## 2022-02-23 RX ADMIN — Medication 1 TABLET(S): at 10:56

## 2022-02-23 RX ADMIN — Medication 650 MILLIGRAM(S): at 06:20

## 2022-02-23 RX ADMIN — OXYCODONE AND ACETAMINOPHEN 2 TABLET(S): 5; 325 TABLET ORAL at 12:28

## 2022-02-23 RX ADMIN — Medication 650 MILLIGRAM(S): at 05:20

## 2022-02-23 RX ADMIN — OXYCODONE AND ACETAMINOPHEN 2 TABLET(S): 5; 325 TABLET ORAL at 13:00

## 2022-02-23 RX ADMIN — CYCLOBENZAPRINE HYDROCHLORIDE 10 MILLIGRAM(S): 10 TABLET, FILM COATED ORAL at 12:28

## 2022-02-23 NOTE — DISCHARGE NOTE PROVIDER - NSDCMRMEDTOKEN_GEN_ALL_CORE_FT
Allegra 24 Hour Allergy oral tablet: 1 tab(s) orally once a day  azelastine 137 mcg/inh (0.1%) nasal spray: 1  nasal 2 times a day  Flonase 50 mcg/inh nasal spray: 1 spray(s) nasal once a day  montelukast 10 mg oral tablet: 1 tab(s) orally once a day  omeprazole 40 mg oral delayed release capsule: 1 cap(s) orally once a day  Percocet 10/325 oral tablet: 1 tab(s) orally every 6 hours

## 2022-02-23 NOTE — DISCHARGE NOTE NURSING/CASE MANAGEMENT/SOCIAL WORK - NSDCPEFALRISK_GEN_ALL_CORE
For information on Fall & Injury Prevention, visit: [email or web address] OR  [email or web address] OR  https://www.cdc.gov/steadi/patient.html

## 2022-02-23 NOTE — DISCHARGE NOTE NURSING/CASE MANAGEMENT/SOCIAL WORK - PATIENT PORTAL LINK FT
You can access the FollowMyHealth Patient Portal offered by [facility] by registering at the following website: [email or web address]. By joining [employer]’s FollowMyHealth portal, you will also be able to view your health information using other applications (apps) compatible with our system.

## 2022-02-23 NOTE — PROVIDER CONTACT NOTE (OTHER) - SITUATION
Office is aware that patient is in [facility].  Please fax discharge papers to [phone].
Spoke with Dr. Mathur. Aware of consult.

## 2022-02-23 NOTE — DISCHARGE NOTE NURSING/CASE MANAGEMENT/SOCIAL WORK - NSDCVIVACCINE_GEN_ALL_CORE_FT
influenza, injectable, quadrivalent, preservative free; 20-Nov-2021 12:10; Haylee Holbrook (SIS); [employer]; [id number] (Exp. Date: 30-Jun-2022); IntraMuscular; Deltoid Left.; 0.5 milliLiter(s); VIS (VIS Published: 06-Aug-2021, VIS Presented: 20-Nov-2021);

## 2022-02-23 NOTE — DISCHARGE NOTE PROVIDER - NSDCFUADDINST_GEN_ALL_CORE_FT
Measure drainage for collection device at 10 pm and 8 am and call office at 930 am at [phone] to determine if drain can be removed tomorrow
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
## 2022-02-23 NOTE — DISCHARGE NOTE PROVIDER - CARE PROVIDER_API CALL
Jacky Perez)  Orthopaedic Surgery  [address] [place] [address]  Phone: [phone]  Fax: [phone]  Follow Up Time:

## 2022-02-23 NOTE — DISCHARGE NOTE PROVIDER - NSDCCPCAREPLAN_GEN_ALL_CORE_FT
PRINCIPAL DISCHARGE DIAGNOSIS  Diagnosis: Pseudarthrosis following spinal fusion  Assessment and Plan of Treatment:

## 2022-02-23 NOTE — PROGRESS NOTE ADULT - SUBJECTIVE AND OBJECTIVE BOX
[facility]                                                            Orthopedic Spine Progress Note    Date of Service 22 @ 09:11    POST OPERATIVE DAY #: 1  STATUS POST:           posterior cervical fusion w inst/lami      Pre-Op Dx: Cervical postlaminectomy syndrome      Post-Op Dx:  Postlaminectomy syndrome, cervical      Prin. Procedure:    posterior cervical fusion w inst/lami      SUBJECTIVE: Patient seen and examined.   feels great  no more neck pain  no N/T  No weakness    Pain (0-10): 3-4  Current Pain Management:  [x PCA....DC today   [ X] start Po Analgesics [ ] IM /IV Anagesics     Vital Signs Last 24 Hrs  T(C): 36.7 (2022 16:26), Max: 36.8 (2022 13:58)  T(F): 98 (2022 16:26), Max: 98.2 (2022 13:58)  HR: 92 (2022 06:00) (77 - 104)  BP: 99/62 (2022 06:00) (91/50 - 130/74)  BP(mean): 71 (2022 06:00) (59 - 74)  RR: 17 (2022 06:00) (9 - 19)  SpO2: 100% (2022 06:00) (93% - 100%)  I&O's Detail    2022 07:01  -  2022 07:00  --------------------------------------------------------  IN:    IV PiggyBack: 750 mL    Lactated Ringers: 160 mL    Oral Fluid: 120 mL    Other (mL): 2000 mL    sodium chloride 0.9%: 1200 mL  Total IN: 4230 mL    OUT:    Bulb (mL): 120 mL......50 last shift...left in place    Indwelling Catheter - Urethral (mL): 1700 mL...dc this am    Other (mL): 450 mL  Total OUT: 2270 mL    Total NET: 1960 mL          OBJECTIVE: looks good  collar in place        Wound /Dressing: clean and dry  Cervical ROM: limited in collar  Lumbar: ROM : wnl  Neurological: A/O x   3            Sensation: [x ] intact to light touch  [ ] decreased:          Motor exam: [ x ]          [x ] Upper extremity    Delt      Bicp       Tri      Wrist ext  Wrst Flex       Digit Ext Digit Flex                                     R         5/5        5/5        5/5       5/5            5/5            5/5       5/5          5/5                                     L          5/5        5/5        5/5       5/5            5/5            5/5       5/5          5/5         [x ] Lower ext.     Hip Flx  Hip Ext   Hip Abd  Hip Add Quad   Hamstrg   TA       EHL      GS                              R        5/5        5/5        5/5             5/5        5/5        5/5        5/5     5/5      5/5                              L         5/5        5/5        5/5             5/5        5/5        5/5        5/5     5/5      5/5                                                        [ x] Vascular : wnl            Tension Signs: neg          Long Tract Findings: neg    RADIOLOGY & ADDITIONAL STUDIES:                                               LABS:                        11.1   14.05 )-----------( 210      ( 2022 06:29 )             33.0     02-23    140  |  109<H>  |  9   ----------------------------<  116<H>  4.1   |  28  |  0.71    Ca    8.7      2022 06:29      PT/INR - ( 2022 07:19 )   PT: 10.7 sec;   INR: 0.91 ratio         PTT - ( 2022 07:19 )  PTT:32.2 sec  Urinalysis Basic - ( 2022 08:05 )    Color: Yellow / Appearance: Clear / S.030 / pH: x  Gluc: x / Ketone: Trace  / Bili: Negative / Urobili: Negative   Blood: x / Protein: Negative / Nitrite: Negative   Leuk Esterase: Trace / RBC: 3-5 /HPF / WBC 3-5   Sq Epi: x / Non Sq Epi: Few / Bacteria: Few        A/P :  56y Female   s/p:    posterior cervical fusion w inst/lami    -    Pain control...dc pca  -    DVT ppx: SCDs    -    Abx:  completed after 2nd dose vancomycin  -    Review labs as indicated     -    Physical Therapy...oob walking  -    Weight bearing status: full  -    Dispo: Home [x ]     Rehab [ ]

## 2022-02-24 ENCOUNTER — INPATIENT (INPATIENT)
Facility: HOSPITAL | Age: 57
LOS: 2 days | Discharge: HOME | DRG: 347 | End: 2022-02-27
Attending: ORTHOPAEDIC SURGERY | Admitting: ORTHOPAEDIC SURGERY
Payer: OTHER MISCELLANEOUS

## 2022-02-24 VITALS — WEIGHT: 149.91 LBS | HEIGHT: 67 IN

## 2022-02-24 DIAGNOSIS — M96.1 POSTLAMINECTOMY SYNDROME, NOT ELSEWHERE CLASSIFIED: ICD-10-CM

## 2022-02-24 DIAGNOSIS — Z98.890 OTHER SPECIFIED POSTPROCEDURAL STATES: Chronic | ICD-10-CM

## 2022-02-24 DIAGNOSIS — M43.22 FUSION OF SPINE, CERVICAL REGION: Chronic | ICD-10-CM

## 2022-02-24 DIAGNOSIS — Z90.49 ACQUIRED ABSENCE OF OTHER SPECIFIED PARTS OF DIGESTIVE TRACT: Chronic | ICD-10-CM

## 2022-02-24 DIAGNOSIS — Z90.89 ACQUIRED ABSENCE OF OTHER ORGANS: Chronic | ICD-10-CM

## 2022-02-24 DIAGNOSIS — Z98.891 HISTORY OF UTERINE SCAR FROM PREVIOUS SURGERY: Chronic | ICD-10-CM

## 2022-02-24 LAB
ALBUMIN SERPL ELPH-MCNC: 3.3 G/DL — SIGNIFICANT CHANGE UP (ref 3.3–5)
ALP SERPL-CCNC: 88 U/L — SIGNIFICANT CHANGE UP (ref 40–120)
ALT FLD-CCNC: 57 U/L — SIGNIFICANT CHANGE UP (ref 12–78)
ANION GAP SERPL CALC-SCNC: 4 MMOL/L — LOW (ref 5–17)
AST SERPL-CCNC: 37 U/L — SIGNIFICANT CHANGE UP (ref 15–37)
BASOPHILS # BLD AUTO: 0.04 K/UL — SIGNIFICANT CHANGE UP (ref 0–0.2)
BASOPHILS NFR BLD AUTO: 0.3 % — SIGNIFICANT CHANGE UP (ref 0–2)
BILIRUB SERPL-MCNC: 0.3 MG/DL — SIGNIFICANT CHANGE UP (ref 0.2–1.2)
BUN SERPL-MCNC: 8 MG/DL — SIGNIFICANT CHANGE UP (ref 7–23)
CALCIUM SERPL-MCNC: 9.1 MG/DL — SIGNIFICANT CHANGE UP (ref 8.5–10.1)
CHLORIDE SERPL-SCNC: 105 MMOL/L — SIGNIFICANT CHANGE UP (ref 96–108)
CO2 SERPL-SCNC: 30 MMOL/L — SIGNIFICANT CHANGE UP (ref 22–31)
CREAT SERPL-MCNC: 0.81 MG/DL — SIGNIFICANT CHANGE UP (ref 0.5–1.3)
EOSINOPHIL # BLD AUTO: 0.07 K/UL — SIGNIFICANT CHANGE UP (ref 0–0.5)
EOSINOPHIL NFR BLD AUTO: 0.5 % — SIGNIFICANT CHANGE UP (ref 0–6)
GLUCOSE SERPL-MCNC: 107 MG/DL — HIGH (ref 70–99)
HCT VFR BLD CALC: 35 % — SIGNIFICANT CHANGE UP (ref 34.5–45)
HGB BLD-MCNC: 11.9 G/DL — SIGNIFICANT CHANGE UP (ref 11.5–15.5)
IMM GRANULOCYTES NFR BLD AUTO: 0.3 % — SIGNIFICANT CHANGE UP (ref 0–1.5)
LYMPHOCYTES # BLD AUTO: 19.5 % — SIGNIFICANT CHANGE UP (ref 13–44)
LYMPHOCYTES # BLD AUTO: 2.66 K/UL — SIGNIFICANT CHANGE UP (ref 1–3.3)
MCHC RBC-ENTMCNC: 31.2 PG — SIGNIFICANT CHANGE UP (ref 27–34)
MCHC RBC-ENTMCNC: 34 GM/DL — SIGNIFICANT CHANGE UP (ref 32–36)
MCV RBC AUTO: 91.6 FL — SIGNIFICANT CHANGE UP (ref 80–100)
MONOCYTES # BLD AUTO: 0.73 K/UL — SIGNIFICANT CHANGE UP (ref 0–0.9)
MONOCYTES NFR BLD AUTO: 5.4 % — SIGNIFICANT CHANGE UP (ref 2–14)
NEUTROPHILS # BLD AUTO: 10.1 K/UL — HIGH (ref 1.8–7.4)
NEUTROPHILS NFR BLD AUTO: 74 % — SIGNIFICANT CHANGE UP (ref 43–77)
PLATELET # BLD AUTO: 220 K/UL — SIGNIFICANT CHANGE UP (ref 150–400)
POTASSIUM SERPL-MCNC: 3.6 MMOL/L — SIGNIFICANT CHANGE UP (ref 3.5–5.3)
POTASSIUM SERPL-SCNC: 3.6 MMOL/L — SIGNIFICANT CHANGE UP (ref 3.5–5.3)
PROT SERPL-MCNC: 6.5 GM/DL — SIGNIFICANT CHANGE UP (ref 6–8.3)
RBC # BLD: 3.82 M/UL — SIGNIFICANT CHANGE UP (ref 3.8–5.2)
RBC # FLD: 12.5 % — SIGNIFICANT CHANGE UP (ref 10.3–14.5)
SARS-COV-2 RNA SPEC QL NAA+PROBE: SIGNIFICANT CHANGE UP
SODIUM SERPL-SCNC: 139 MMOL/L — SIGNIFICANT CHANGE UP (ref 135–145)
WBC # BLD: 13.64 K/UL — HIGH (ref 3.8–10.5)
WBC # FLD AUTO: 13.64 K/UL — HIGH (ref 3.8–10.5)

## 2022-02-24 PROCEDURE — 71045 X-RAY EXAM CHEST 1 VIEW: CPT

## 2022-02-24 PROCEDURE — 72040 X-RAY EXAM NECK SPINE 2-3 VW: CPT | Mod: 26

## 2022-02-24 PROCEDURE — 93010 ELECTROCARDIOGRAM REPORT: CPT

## 2022-02-24 PROCEDURE — 99253 IP/OBS CNSLTJ NEW/EST LOW 45: CPT

## 2022-02-24 PROCEDURE — 72040 X-RAY EXAM NECK SPINE 2-3 VW: CPT

## 2022-02-24 PROCEDURE — 87635 SARS-COV-2 COVID-19 AMP PRB: CPT

## 2022-02-24 PROCEDURE — 71045 X-RAY EXAM CHEST 1 VIEW: CPT | Mod: 26

## 2022-02-24 PROCEDURE — 86803 HEPATITIS C AB TEST: CPT

## 2022-02-24 PROCEDURE — 97162 PT EVAL MOD COMPLEX 30 MIN: CPT | Mod: GP

## 2022-02-24 PROCEDURE — 94640 AIRWAY INHALATION TREATMENT: CPT

## 2022-02-24 PROCEDURE — 85027 COMPLETE CBC AUTOMATED: CPT

## 2022-02-24 PROCEDURE — 36415 COLL VENOUS BLD VENIPUNCTURE: CPT

## 2022-02-24 PROCEDURE — 97116 GAIT TRAINING THERAPY: CPT | Mod: GP

## 2022-02-24 PROCEDURE — 85025 COMPLETE CBC W/AUTO DIFF WBC: CPT

## 2022-02-24 PROCEDURE — 80053 COMPREHEN METABOLIC PANEL: CPT

## 2022-02-24 PROCEDURE — 99285 EMERGENCY DEPT VISIT HI MDM: CPT

## 2022-02-24 RX ORDER — LORATADINE 10 MG/1
10 TABLET ORAL DAILY
Refills: 0 | Status: DISCONTINUED | OUTPATIENT
Start: 2022-02-24 | End: 2022-02-27

## 2022-02-24 RX ORDER — NALOXONE HYDROCHLORIDE 4 MG/.1ML
0.1 SPRAY NASAL
Refills: 0 | Status: DISCONTINUED | OUTPATIENT
Start: 2022-02-24 | End: 2022-02-26

## 2022-02-24 RX ORDER — SODIUM CHLORIDE 9 MG/ML
1000 INJECTION, SOLUTION INTRAVENOUS
Refills: 0 | Status: DISCONTINUED | OUTPATIENT
Start: 2022-02-24 | End: 2022-02-27

## 2022-02-24 RX ORDER — HYDROMORPHONE HYDROCHLORIDE 2 MG/ML
0.5 INJECTION INTRAMUSCULAR; INTRAVENOUS; SUBCUTANEOUS
Refills: 0 | Status: DISCONTINUED | OUTPATIENT
Start: 2022-02-24 | End: 2022-02-26

## 2022-02-24 RX ORDER — CYCLOBENZAPRINE HYDROCHLORIDE 10 MG/1
10 TABLET, FILM COATED ORAL THREE TIMES A DAY
Refills: 0 | Status: DISCONTINUED | OUTPATIENT
Start: 2022-02-24 | End: 2022-02-27

## 2022-02-24 RX ORDER — POLYETHYLENE GLYCOL 3350 17 G/17G
17 POWDER, FOR SOLUTION ORAL
Refills: 0 | Status: DISCONTINUED | OUTPATIENT
Start: 2022-02-24 | End: 2022-02-27

## 2022-02-24 RX ORDER — HYDROMORPHONE HYDROCHLORIDE 2 MG/ML
1 INJECTION INTRAMUSCULAR; INTRAVENOUS; SUBCUTANEOUS ONCE
Refills: 0 | Status: DISCONTINUED | OUTPATIENT
Start: 2022-02-24 | End: 2022-02-24

## 2022-02-24 RX ORDER — ONDANSETRON 8 MG/1
4 TABLET, FILM COATED ORAL EVERY 6 HOURS
Refills: 0 | Status: DISCONTINUED | OUTPATIENT
Start: 2022-02-24 | End: 2022-02-26

## 2022-02-24 RX ORDER — PANTOPRAZOLE SODIUM 20 MG/1
40 TABLET, DELAYED RELEASE ORAL
Refills: 0 | Status: DISCONTINUED | OUTPATIENT
Start: 2022-02-24 | End: 2022-02-27

## 2022-02-24 RX ORDER — FLUTICASONE PROPIONATE 50 MCG
1 SPRAY, SUSPENSION NASAL DAILY
Refills: 0 | Status: DISCONTINUED | OUTPATIENT
Start: 2022-02-24 | End: 2022-02-27

## 2022-02-24 RX ORDER — MONTELUKAST 4 MG/1
10 TABLET, CHEWABLE ORAL DAILY
Refills: 0 | Status: DISCONTINUED | OUTPATIENT
Start: 2022-02-24 | End: 2022-02-27

## 2022-02-24 RX ORDER — CELECOXIB 200 MG/1
400 CAPSULE ORAL ONCE
Refills: 0 | Status: COMPLETED | OUTPATIENT
Start: 2022-02-24 | End: 2022-02-24

## 2022-02-24 RX ORDER — ACETAMINOPHEN 500 MG
650 TABLET ORAL EVERY 6 HOURS
Refills: 0 | Status: DISCONTINUED | OUTPATIENT
Start: 2022-02-24 | End: 2022-02-27

## 2022-02-24 RX ORDER — LANOLIN ALCOHOL/MO/W.PET/CERES
3 CREAM (GRAM) TOPICAL AT BEDTIME
Refills: 0 | Status: DISCONTINUED | OUTPATIENT
Start: 2022-02-24 | End: 2022-02-27

## 2022-02-24 RX ORDER — ONDANSETRON 8 MG/1
4 TABLET, FILM COATED ORAL EVERY 8 HOURS
Refills: 0 | Status: DISCONTINUED | OUTPATIENT
Start: 2022-02-24 | End: 2022-02-27

## 2022-02-24 RX ORDER — CELECOXIB 200 MG/1
200 CAPSULE ORAL DAILY
Refills: 0 | Status: DISCONTINUED | OUTPATIENT
Start: 2022-02-25 | End: 2022-02-27

## 2022-02-24 RX ORDER — HYDROMORPHONE HYDROCHLORIDE 2 MG/ML
30 INJECTION INTRAMUSCULAR; INTRAVENOUS; SUBCUTANEOUS
Refills: 0 | Status: DISCONTINUED | OUTPATIENT
Start: 2022-02-24 | End: 2022-02-26

## 2022-02-24 RX ADMIN — CYCLOBENZAPRINE HYDROCHLORIDE 10 MILLIGRAM(S): 10 TABLET, FILM COATED ORAL at 21:34

## 2022-02-24 RX ADMIN — HYDROMORPHONE HYDROCHLORIDE 30 MILLILITER(S): 2 INJECTION INTRAMUSCULAR; INTRAVENOUS; SUBCUTANEOUS at 13:28

## 2022-02-24 RX ADMIN — HYDROMORPHONE HYDROCHLORIDE 1 MILLIGRAM(S): 2 INJECTION INTRAMUSCULAR; INTRAVENOUS; SUBCUTANEOUS at 10:49

## 2022-02-24 RX ADMIN — Medication 10 MILLIGRAM(S): at 21:34

## 2022-02-24 RX ADMIN — Medication 1 SPRAY(S): at 12:25

## 2022-02-24 RX ADMIN — CYCLOBENZAPRINE HYDROCHLORIDE 10 MILLIGRAM(S): 10 TABLET, FILM COATED ORAL at 12:25

## 2022-02-24 RX ADMIN — POLYETHYLENE GLYCOL 3350 17 GRAM(S): 17 POWDER, FOR SOLUTION ORAL at 21:34

## 2022-02-24 RX ADMIN — CELECOXIB 400 MILLIGRAM(S): 200 CAPSULE ORAL at 12:25

## 2022-02-24 NOTE — H&P ADULT - NSHPPHYSICALEXAM_GEN_ALL_CORE
Vital Signs Last 24 Hrs  T(C): 36.6 (24 Feb 2022 09:35), Max: 36.8 (23 Feb 2022 14:00)  T(F): 97.9 (24 Feb 2022 09:35), Max: 98.2 (23 Feb 2022 14:00)  HR: 111 (24 Feb 2022 09:35) (96 - 111)  BP: 104/62 (24 Feb 2022 09:35) (104/62 - 116/61)  BP(mean): 76 (24 Feb 2022 09:35) (75 - 76)  RR: 19 (24 Feb 2022 09:35) (15 - 19)  SpO2: 100% (24 Feb 2022 09:35) (94% - 100%)    Patient sitting upright in chair-soft cervical collar in place  HEENT unremarkable  Neck with posterior surgical bandage in place with drain in place-draining serosanguinous fluid, markedly restricted AROM due to pain, no focal motor deficits UEs, negative Millie'  s/clonus/hyperreflexia, no adenopathy, thyroid nonpalpable  Heart with tachycardia due to pain  Lungs clear  Abdomen soft, nontender, well healed surgical scars  Extremities with FROM, no joint swelling or tenderness  Vascular without deficits

## 2022-02-24 NOTE — ED STATDOCS - CLINICAL SUMMARY MEDICAL DECISION MAKING FREE TEXT BOX
here to be admitted to neurosurgery service. neurosurgery PA at bedside. will do admission, screening labs / get pain control.

## 2022-02-24 NOTE — ED STATDOCS - NS ED ROS FT
Constitutional: No reported recent fever.  Neurological: No reported acute headache.  Eyes: No reported new vision changes.   Ears, Nose, Mouth, Throat: No reported acute sore throat.  Cardiovascular: No reported current chest pain.  Respiratory: No reported new shortness of breath.  Gastrointestinal: No reported vomiting.  Genitourinary: No reported new urinary problems.  Musculoskeletal: No reported acute extremity pain.  + neck pain   Integumentary (skin and/or breast): No reported new rash.

## 2022-02-24 NOTE — CONSULT NOTE ADULT - SUBJECTIVE AND OBJECTIVE BOX
CC-  Intractable post op neck pain (24 Feb 2022 10:08)    HPI:  Patient presents emergently with intractable posterior neck pain. Patient underwent ACDF C4/5 & C6/7 in November, 2021. Unfortunately she developed severe recurrent neck pain. 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 Patient underwent posterior cervical fusion C4-7 on 2/22/22. Patient felt she could go home yesterday afternoon but developed intractable neck pain that was not controlled with high doses of oral Oxycodone. She called office this AM and was told to come to ER to be readmitted for assessment and pain management. Patient states pain so severe that she has difficulty moving. No weakness upper or lower extremities or change in bowel/bladder function. (24 Feb 2022 10:08) Medical consult called for medical management    PMH- as above  PSH- C-spine fusion, Csection x3, lap micky, tonsillectomy  Soc hx- denies smoking, alcohol socially  Fam hx- m CAD, diabetes    2/24/22- feels comfortable now, on PCA dilaudid    Review of system- All 10 systems reviewed and is as per HPI otherwise negative.     T(C): 36.7 (02-24-22 @ 17:02), Max: 36.7 (02-24-22 @ 12:28)  HR: 97 (02-24-22 @ 17:02) (97 - 111)  BP: 96/54 (02-24-22 @ 17:02) (96/54 - 137/90)  RR: 18 (02-24-22 @ 17:02) (16 - 19)  SpO2: 96% (02-24-22 @ 17:02) (96% - 100%)  Wt(kg): --    LABS:                        11.9   13.64 )-----------( 220      ( 24 Feb 2022 10:29 )             35.0     02-24    139  |  105  |  8   ----------------------------<  107<H>  3.6   |  30  |  0.81    Ca    9.1      24 Feb 2022 10:29    TPro  6.5  /  Alb  3.3  /  TBili  0.3  /  DBili  x   /  AST  37  /  ALT  57  /  AlkPhos  88  02-24    POCT Blood Glucose.: 126 mg/dL (22 Feb 2022 23:01)    RADIOLOGY & ADDITIONAL TESTS:      PHYSICAL EXAM:  GENERAL: NAD, well-groomed, well-developed  HEAD:  Atraumatic, Normocephalic  EYES: EOMI, PERRLA, conjunctiva and sclera clear  HEENT: Moist mucous membranes  NECK: neck collar is on  NERVOUS SYSTEM:  Alert & Oriented X3, Motor Strength 5/5 B/L upper and lower extremities; DTRs 2+ intact and symmetric  CHEST/LUNG: Clear to auscultation bilaterally; No rales, rhonchi, wheezing, or rubs  HEART: Regular rate and rhythm; No murmurs, rubs, or gallops  ABDOMEN: Soft, Nontender, Nondistended; Bowel sounds present  GENITOURINARY- Voiding, no palpable bladder  EXTREMITIES:  2+ Peripheral Pulses, No clubbing, cyanosis, or edema  MUSCULOSKELTAL- No muscle tenderness, Muscle tone normal, No joint tenderness, no Joint swelling, Joint range of motion-normal  SKIN-no rash, no lesion  CNS- alert, oriented X3, non focal     Daily Height in cm: 170.18 (24 Feb 2022 09:29)      MEDICATIONS  (STANDING):  cyclobenzaprine 10 milliGRAM(s) Oral three times a day  fluticasone propionate (50 MICROgram(s)/actuation) Nasal Spray - Peds 1 Spray(s) Alternating Nostrils daily  HYDROmorphone PCA (1 mG/mL) 30 milliLiter(s) PCA Continuous PCA Continuous  lactated ringers. 1000 milliLiter(s) (30 mL/Hr) IV Continuous <Continuous>  loratadine 10 milliGRAM(s) Oral daily  montelukast 10 milliGRAM(s) Oral daily  pantoprazole    Tablet 40 milliGRAM(s) Oral before breakfast    MEDICATIONS  (PRN):  acetaminophen     Tablet .. 650 milliGRAM(s) Oral every 6 hours PRN Temp greater or equal to 38C (100.4F), Mild Pain (1 - 3)  aluminum hydroxide/magnesium hydroxide/simethicone Suspension 30 milliLiter(s) Oral every 4 hours PRN Dyspepsia  HYDROmorphone PCA (1 mG/mL) Rescue Clinician Bolus 0.5 milliGRAM(s) IV Push every 15 minutes PRN for Pain Scale GREATER THAN 6  melatonin 3 milliGRAM(s) Oral at bedtime PRN Insomnia  naloxone Injectable 0.1 milliGRAM(s) IV Push every 3 minutes PRN For ANY of the following changes in patient status:  A. RR LESS THAN 10 breaths per minute, B. Oxygen saturation LESS THAN 90%, C. Sedation score of 6  ondansetron Injectable 4 milliGRAM(s) IV Push every 6 hours PRN Nausea  ondansetron Injectable 4 milliGRAM(s) IV Push every 8 hours PRN Nausea and/or Vomiting    Assessment/Plan  #Postlaminectomy syndrome s/p recent ACDF C4/5 and C6/7  Spine following  Pain meds via PCA dilaudid  Muscle relaxants prn  Incentive spirometry  Bowel regimen    #Allergic sinusitis  Cont flonase    #DVt proph- venodynes, ambulate    #Dispo- thank you for consult, will follow with you.

## 2022-02-24 NOTE — ED STATDOCS - PROGRESS NOTE DETAILS
55 y/o F with PMH of cervical laminectomy/fusion 2 days ago presents with worsening cervical spine pain. Was on PCA pump while admitted, but states since discharge she's had unbearable pain. Denies fever, numbness/tingling in extremities. +chills. Contacted Dr. Perez who advised patient come to ED. PE: Uncomfortable appearing. HEENT: +soft collar in place. DANILO drain in place. PERRLA, EOMI. Neuro: CN II-XII intact. Sensation intact to light touch in all extremities. 5/5 strength in all extremities. Cardiac: s1s2, RRR. lungs: CTAB. A/P: As per ortho, admission for post laminectomy syndrome. Basic labs, EKG, CXR, covid ordered. Will provide dilaudid in ED. - Mani Cesar PA-C

## 2022-02-24 NOTE — ED STATDOCS - OBJECTIVE STATEMENT
56 F hx allergic sinusitis, cervical disc disease, cervical disc herniation, GERD, colitis, sleep apnea here c/o neck pain in the setting of recent surgery.  pt had revision surgery including application and removal Desir tongs, L laminotomy C4/5 & C6/7, posterior cervical fusion C4-C7 with BMP, LBG allograft and SSI on 2/22/22 performed by Dr. Perez and was dc yesterday. Pt here with worsening neck pain even when taking the prescribed pain medication. 56 F hx allergic sinusitis, cervical disc disease, cervical disc herniation, GERD, colitis, sleep apnea here c/o neck pain in setting of recent surgery.  pt 2 days ago on 2/22/22 had revision surgery including application and removal Desir tongs, L laminotomy C4/5 & C6/7, posterior cervical fusion C4-C7 with BMP, LBG allograft and SSI performed by Dr. Perez and was dc yesterday. Pt here with worsening neck pain even when taking the prescribed pain medication. 56 F hx allergic sinusitis, cervical disc disease, cervical disc herniation, GERD, colitis, sleep apnea here c/o neck pain in setting of recent surgery.  pt 2 days ago on 2/22/22 had revision surgery including application and removal Desir tongs, L laminotomy C4/5 & C6/7, posterior cervical fusion C4-C7 with BMP, LBG allograft and SSI performed by Dr. Perez and was dc yesterday. Pt here with worsening neck pain even when taking the prescribed pain medication. pt with no other medical complaints. feels well otherwise.

## 2022-02-24 NOTE — H&P ADULT - ASSESSMENT
Patient presents emergently with intractable posterior neck pain. Patient underwent ACDF C4/5 & C6/7 in November, 2021. Unfortunately she developed severe recurrent neck pain. 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 Patient underwent posterior cervical fusion C4-7 on 2/22/22. Patient felt she could go home yesterday afternoon but developed intractable neck pain that was not controlled with high doses of oral Oxycodone. She called office this AM and was told to come to ER to be readmitted for assessment and pain management. Patient states pain so severe that she has difficulty moving. No weakness upper or lower extremities or change in bowel/bladder function.    IMP:  Intractable post op pain  Post laminectomy syndrome cervical spine  Asthma    PLAN:  Patient admitted  PCA analgesia to be started by Anesthesia  Cervical collar for comfort  OOB/PT as tolerated  Flexeril 10 mg q8hr  Monitor drain output  Monitor labs  Monitor temps

## 2022-02-24 NOTE — PATIENT PROFILE ADULT - NSPROPTRIGHTBILLOFRIGHTS_GEN_A_NUR
Reason For Visit  JEWELS REARDON is here today for a nurse visit for x-ray.      Current Meds   1. Melatonin 3 MG Oral Capsule;   Therapy: (Recorded:[id number]) to Recorded   2. Methylphenidate HCl - 10 MG Oral Tablet; TAKE 1 TABLET DAILY at 4pm;   Therapy: [id number] to (Evaluate:[id number]); Last Rx:[id number] Ordered   3. Methylphenidate HCl ER (CD) 40 MG Oral Capsule Extended Release; Take 40mg by   mouth once daily in the AM;   Therapy: [id number] to (Evaluate:[id number]); Last Rx:[id number] Ordered    Allergies  No Known Drug Allergies    Vitals  Vital Signs    Recorded: 30Jun2017 08:19AM   Height 5 ft 0.25 in   Weight 92 lb 4 oz   BMI Calculated 17.87   BSA Calculated 1.35   BMI Percentile 65   2-20 Stature Percentile 95 %   2-20 Weight Percentile 83 %   Systolic 108   Diastolic 60   Temperature 97.5 F   Heart Rate 74   Respiration 22   O2 Saturation 100     Plan    Patient Instructions STAT 3V right knee and pelvis w/ hali hips x-rays  Ordered by Dr. Reji Sandhu  Performed by Elina Boone RT(R)  Assessment: M79.651.      Signatures   Electronically signed by : Elina Boone CMA; Jun 30 2017  9:22AM CST    
patient

## 2022-02-24 NOTE — PATIENT PROFILE ADULT - FALL HARM RISK - HARM RISK INTERVENTIONS

## 2022-02-24 NOTE — ED STATDOCS - ATTENDING CONTRIBUTION TO CARE
I, Peter Chandra DO, personally saw the patient with ACP.  I performed a substantiative portion of the visit. I personally performed a face to face diagnostic evaluation on this patient and formulated the patient plan. Free text HPI, ROS, PE documented above by myself or with the aid of a scribe and represents my findings. The case was discussed with, and handed off to ACP who followed the case through to the re-evaluation and disposition.

## 2022-02-24 NOTE — H&P ADULT - HISTORY OF PRESENT ILLNESS
Patient presents emergently with intractable posterior neck pain. Patient underwent ACDF C4/5 & C6/7 in November, 2021. Unfortunately she developed severe recurrent neck pain. 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 Patient underwent posterior cervical fusion C4-7 on 2/22/22. Patient felt she could go home yesterday afternoon but developed intractable neck pain that was not controlled with high doses of oral Oxycodone. She called office this AM and was told to come to ER to be readmitted for assessment and pain management. Patient states pain so severe that she has difficulty moving. No weakness upper or lower extremities or change in bowel/bladder function.

## 2022-02-24 NOTE — ED ADULT NURSE NOTE - CHIEF COMPLAINT QUOTE
Pt presented to the ER with c/o neck pain. Pt has neck surgery on Tuesday by Dr. Perez and was D/C  yesterday. Pt was having increase pain even when taking the prescribed pain medication. Pt denies any injury or trauma. Pt noted to have a DANILO drain in place.

## 2022-02-24 NOTE — ED STATDOCS - PHYSICAL EXAMINATION
Vital signs as available reviewed.  General:   appears uncomfortable   Head:  Normocephalic, atraumatic.  ENMT: + soft collar around neck with drain in place   Eyes:  Conjunctiva pink, no icterus.  Cardiovascular:  + mild tachycardia, no obvious murmur.  Respiratory:  Clear to auscultation, good air entry bilaterally.  Abdomen:  Soft, non-tender.  Musculoskeletal:  No obvious deformity.  Neurologic: Alert and oriented, moving all extremities. cranial nerves 2-12 intact, 5/5  strength UE   Skin:  Warm and dry.

## 2022-02-25 LAB
HCT VFR BLD CALC: 30.4 % — LOW (ref 34.5–45)
HCV AB S/CO SERPL IA: 0.07 S/CO — SIGNIFICANT CHANGE UP (ref 0–0.99)
HCV AB SERPL-IMP: SIGNIFICANT CHANGE UP
HGB BLD-MCNC: 9.9 G/DL — LOW (ref 11.5–15.5)
MCHC RBC-ENTMCNC: 30.4 PG — SIGNIFICANT CHANGE UP (ref 27–34)
MCHC RBC-ENTMCNC: 32.6 GM/DL — SIGNIFICANT CHANGE UP (ref 32–36)
MCV RBC AUTO: 93.3 FL — SIGNIFICANT CHANGE UP (ref 80–100)
PLATELET # BLD AUTO: 177 K/UL — SIGNIFICANT CHANGE UP (ref 150–400)
RBC # BLD: 3.26 M/UL — LOW (ref 3.8–5.2)
RBC # FLD: 12.5 % — SIGNIFICANT CHANGE UP (ref 10.3–14.5)
WBC # BLD: 7.39 K/UL — SIGNIFICANT CHANGE UP (ref 3.8–10.5)
WBC # FLD AUTO: 7.39 K/UL — SIGNIFICANT CHANGE UP (ref 3.8–10.5)

## 2022-02-25 PROCEDURE — 99232 SBSQ HOSP IP/OBS MODERATE 35: CPT

## 2022-02-25 RX ORDER — OXYCODONE HYDROCHLORIDE 5 MG/1
5 TABLET ORAL EVERY 4 HOURS
Refills: 0 | Status: DISCONTINUED | OUTPATIENT
Start: 2022-02-25 | End: 2022-02-27

## 2022-02-25 RX ORDER — HYDROMORPHONE HYDROCHLORIDE 2 MG/ML
2 INJECTION INTRAMUSCULAR; INTRAVENOUS; SUBCUTANEOUS EVERY 4 HOURS
Refills: 0 | Status: DISCONTINUED | OUTPATIENT
Start: 2022-02-25 | End: 2022-02-27

## 2022-02-25 RX ORDER — MULTIVIT WITH MIN/MFOLATE/K2 340-15/3 G
1 POWDER (GRAM) ORAL ONCE
Refills: 0 | Status: COMPLETED | OUTPATIENT
Start: 2022-02-25 | End: 2022-02-25

## 2022-02-25 RX ORDER — HYDROMORPHONE HYDROCHLORIDE 2 MG/ML
4 INJECTION INTRAMUSCULAR; INTRAVENOUS; SUBCUTANEOUS EVERY 4 HOURS
Refills: 0 | Status: DISCONTINUED | OUTPATIENT
Start: 2022-02-25 | End: 2022-02-27

## 2022-02-25 RX ADMIN — LORATADINE 10 MILLIGRAM(S): 10 TABLET ORAL at 09:21

## 2022-02-25 RX ADMIN — CYCLOBENZAPRINE HYDROCHLORIDE 10 MILLIGRAM(S): 10 TABLET, FILM COATED ORAL at 13:16

## 2022-02-25 RX ADMIN — Medication 1 BOTTLE: at 17:03

## 2022-02-25 RX ADMIN — PANTOPRAZOLE SODIUM 40 MILLIGRAM(S): 20 TABLET, DELAYED RELEASE ORAL at 05:26

## 2022-02-25 RX ADMIN — Medication 1 SPRAY(S): at 12:00

## 2022-02-25 RX ADMIN — CELECOXIB 200 MILLIGRAM(S): 200 CAPSULE ORAL at 05:25

## 2022-02-25 RX ADMIN — CYCLOBENZAPRINE HYDROCHLORIDE 10 MILLIGRAM(S): 10 TABLET, FILM COATED ORAL at 21:56

## 2022-02-25 RX ADMIN — MONTELUKAST 10 MILLIGRAM(S): 4 TABLET, CHEWABLE ORAL at 09:21

## 2022-02-25 RX ADMIN — POLYETHYLENE GLYCOL 3350 17 GRAM(S): 17 POWDER, FOR SOLUTION ORAL at 09:21

## 2022-02-25 RX ADMIN — CYCLOBENZAPRINE HYDROCHLORIDE 10 MILLIGRAM(S): 10 TABLET, FILM COATED ORAL at 05:25

## 2022-02-25 NOTE — PROGRESS NOTE ADULT - SUBJECTIVE AND OBJECTIVE BOX
HPI:  Patient presents emergently with intractable posterior neck pain. Patient underwent ACDF C4/5 & C6/7 in . Unfortunately she developed severe recurrent neck pain. 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 Patient underwent posterior cervical fusion C4-7 on 22. Patient felt she could go home yesterday afternoon but developed intractable neck pain that was not controlled with high doses of oral Oxycodone. She called office this AM and was told to come to ER to be readmitted for assessment and pain management. Patient states pain so severe that she has difficulty moving. No weakness upper or lower extremities or change in bowel/bladder function. (2022 10:08)    22 Patient with persistent moderate L side neck pain-no radicular pain. Difficulty transferring out of bed to the left. On PCA demand only    PAST MEDICAL & SURGICAL HISTORY:  Allergic sinusitis    Cervical disc herniation    GERD (gastroesophageal reflux disease)    History of colitis    Cervical disc disease     novel coronavirus disease (COVID-19)  2020--Hospitalized , pericarditis , recovered , followed with cardiology post discharge Dr Delta Davalos    Sleep apnea  No CPAP or oral appliance--patient states insurance did not cover    COVID-19 vaccine series completed  Pfizer --completed 2021, booster Moderna--2021    H/O  section  , ,    Cervical vertebral fusion  1992    History of laparoscopic cholecystectomy      History of tonsillectomy  1969    History of cardiac cath  10-15 yrs ago    MEDICATIONS  (STANDING):  celecoxib 200 milliGRAM(s) Oral daily  cyclobenzaprine 10 milliGRAM(s) Oral three times a day  fluticasone propionate (50 MICROgram(s)/actuation) Nasal Spray - Peds 1 Spray(s) Alternating Nostrils daily  HYDROmorphone PCA (1 mG/mL) 30 milliLiter(s) PCA Continuous PCA Continuous  lactated ringers. 1000 milliLiter(s) (30 mL/Hr) IV Continuous <Continuous>  loratadine 10 milliGRAM(s) Oral daily  montelukast 10 milliGRAM(s) Oral daily  pantoprazole    Tablet 40 milliGRAM(s) Oral before breakfast  polyethylene glycol 3350 17 Gram(s) Oral two times a day    MEDICATIONS  (PRN):  acetaminophen     Tablet .. 650 milliGRAM(s) Oral every 6 hours PRN Temp greater or equal to 38C (100.4F), Mild Pain (1 - 3)  aluminum hydroxide/magnesium hydroxide/simethicone Suspension 30 milliLiter(s) Oral every 4 hours PRN Dyspepsia  HYDROmorphone PCA (1 mG/mL) Rescue Clinician Bolus 0.5 milliGRAM(s) IV Push every 15 minutes PRN for Pain Scale GREATER THAN 6  melatonin 3 milliGRAM(s) Oral at bedtime PRN Insomnia  naloxone Injectable 0.1 milliGRAM(s) IV Push every 3 minutes PRN For ANY of the following changes in patient status:  A. RR LESS THAN 10 breaths per minute, B. Oxygen saturation LESS THAN 90%, C. Sedation score of 6  ondansetron Injectable 4 milliGRAM(s) IV Push every 6 hours PRN Nausea  ondansetron Injectable 4 milliGRAM(s) IV Push every 8 hours PRN Nausea and/or Vomiting    Allergies    penicillin (Anaphylaxis)    Intolerances    PE:    Vital Signs Last 24 Hrs  T(C): 36.7 (2022 08:28), Max: 36.7 (2022 12:28)  T(F): 98.1 (2022 08:28), Max: 98.1 (2022 20:13)  HR: 91 (2022 08:28) (84 - 98)  BP: 104/52 (2022 08:28) (96/54 - 137/90)  BP(mean): --  RR: 18 (2022 08:28) (16 - 18)  SpO2: 95% (2022 08:28) (95% - 100%)    Patient sitting upright in bed with soft cervical collar in place.  Tolerating diet   Voiding without difficulty  DANILO with decreasing drainage-will leave in today  Dressing dry and intact  Tender L paracervical muscle-no erythema noted  No motor deficits UEs  Negative Millie's/clonus/hyperreflexia    LABS                        11.9   13.64 )-----------( 220      ( 2022 10:29 )             35.0     02-24    139  |  105  |  8   ----------------------------<  107<H>  3.6   |  30  |  0.81    Ca    9.1      2022 10:29    TPro  6.5  /  Alb  3.3  /  TBili  0.3  /  DBili  x   /  AST  37  /  ALT  57  /  AlkPhos  88

## 2022-02-25 NOTE — PROGRESS NOTE ADULT - SUBJECTIVE AND OBJECTIVE BOX
CC-  Intractable post op neck pain (24 Feb 2022 10:08)    HPI:  Patient presents emergently with intractable posterior neck pain. Patient underwent ACDF C4/5 & C6/7 in November, 2021. Unfortunately she developed severe recurrent neck pain. 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 Patient underwent posterior cervical fusion C4-7 on 2/22/22. Patient felt she could go home yesterday afternoon but developed intractable neck pain that was not controlled with high doses of oral Oxycodone. She called office this AM and was told to come to ER to be readmitted for assessment and pain management. Patient states pain so severe that she has difficulty moving. No weakness upper or lower extremities or change in bowel/bladder function. (24 Feb 2022 10:08) Medical consult called for medical management    2/25/22- Pt seen and examined at bedside. Endorsing improvement in neck pain but continues to endorse L lateral neck discomfort. States she is concerned about getting into and out of bed when home, but confirms has family support at home for help. States she has not had BM since monday. +Hydromorphone PCA pump. Denies CP, SOB, abd pain, n/v/d, numbness, tingling to extremities, bowel/bladder incontinence.     Review of system- All 10 systems reviewed and is as per HPI otherwise negative.     T(F): 97.7 (02-25-22 @ 11:27), Max: 98.1 (02-24-22 @ 20:13)  HR: 93 (02-25-22 @ 11:27) (84 - 98)  BP: 110/63 (02-25-22 @ 11:27) (96/54 - 137/90)  RR: 18 (02-25-22 @ 11:27) (16 - 18)  SpO2: 94% (02-25-22 @ 11:27) (94% - 100%)  Wt(kg): --    I&O's Summary    24 Feb 2022 07:01  -  25 Feb 2022 07:00  --------------------------------------------------------  IN: 0 mL / OUT: 70 mL / NET: -70 mL        LABS:                                   9.9    7.39  )-----------( 177      ( 25 Feb 2022 06:51 )             30.4       02-24    139  |  105  |  8   ----------------------------<  107<H>  3.6   |  30  |  0.81    Ca    9.1      24 Feb 2022 10:29    TPro  6.5  /  Alb  3.3  /  TBili  0.3  /  DBili  x   /  AST  37  /  ALT  57  /  AlkPhos  88  02-24          PHYSICAL EXAM:  GENERAL: NAD, well-groomed, well-developed  HEAD:  Atraumatic, Normocephalic  EYES: EOMI, PERRLA, conjunctiva and sclera clear  HEENT: Moist mucous membranes  NECK: cervical dressing c/d/i,  +DANILO w/ decreasing drainage  NERVOUS SYSTEM:  Alert & Oriented X3, Motor Strength 5/5 B/L upper and lower extremities; DTRs 2+ intact and symmetric  CHEST/LUNG: Clear to auscultation bilaterally; No rales, rhonchi, wheezing, or rubs  HEART: Regular rate and rhythm; No murmurs, rubs, or gallops  ABDOMEN: Soft, Nontender, Nondistended; Bowel sounds present  GENITOURINARY- Voiding, no palpable bladder  EXTREMITIES:  2+ Peripheral Pulses, No clubbing, cyanosis, or edema  MUSCULOSKELTAL-  mild L paracervical ttp, Muscle tone normal, No joint tenderness, no Joint swelling,   SKIN-no rash, no lesion  CNS- alert, oriented X3, non focal     Daily Height in cm: 170.18 (24 Feb 2022 09:29)      MEDICATIONS  (STANDING):  celecoxib 200 milliGRAM(s) Oral daily  cyclobenzaprine 10 milliGRAM(s) Oral three times a day  fluticasone propionate (50 MICROgram(s)/actuation) Nasal Spray - Peds 1 Spray(s) Alternating Nostrils daily  HYDROmorphone PCA (1 mG/mL) 30 milliLiter(s) PCA Continuous PCA Continuous  lactated ringers. 1000 milliLiter(s) (30 mL/Hr) IV Continuous <Continuous>  loratadine 10 milliGRAM(s) Oral daily  montelukast 10 milliGRAM(s) Oral daily  pantoprazole    Tablet 40 milliGRAM(s) Oral before breakfast  polyethylene glycol 3350 17 Gram(s) Oral two times a day    MEDICATIONS  (PRN):  acetaminophen     Tablet .. 650 milliGRAM(s) Oral every 6 hours PRN Temp greater or equal to 38C (100.4F), Mild Pain (1 - 3)  aluminum hydroxide/magnesium hydroxide/simethicone Suspension 30 milliLiter(s) Oral every 4 hours PRN Dyspepsia  HYDROmorphone   Tablet 2 milliGRAM(s) Oral every 4 hours PRN Moderate Pain (4 - 6)  HYDROmorphone   Tablet 4 milliGRAM(s) Oral every 4 hours PRN Severe Pain (7 - 10)  HYDROmorphone PCA (1 mG/mL) Rescue Clinician Bolus 0.5 milliGRAM(s) IV Push every 15 minutes PRN for Pain Scale GREATER THAN 6  melatonin 3 milliGRAM(s) Oral at bedtime PRN Insomnia  naloxone Injectable 0.1 milliGRAM(s) IV Push every 3 minutes PRN For ANY of the following changes in patient status:  A. RR LESS THAN 10 breaths per minute, B. Oxygen saturation LESS THAN 90%, C. Sedation score of 6  ondansetron Injectable 4 milliGRAM(s) IV Push every 6 hours PRN Nausea  ondansetron Injectable 4 milliGRAM(s) IV Push every 8 hours PRN Nausea and/or Vomiting  oxyCODONE    IR 5 milliGRAM(s) Oral every 4 hours PRN Mild Pain (1 - 3)        Assessment/Plan  #Postlaminectomy syndrome s/p recent ACDF C4/5 and C6/7  Spine following  Pain meds via PCA dilaudid, tylenol   Muscle relaxants prn  Incentive spirometry  Bowel regimen   IVF    #anemia  HGB 9.9, asymptomatic, VSS  Repeat CBC in AM       #constipation   unrelieved with miralax  plan for mag citrate     #Allergic sinusitis  Cont flonase    #DVT proph-   Venodynes  OOB/ PT as tolerated    #Dispo- will continue to follow      CC-  Intractable post op neck pain (24 Feb 2022 10:08)    HPI:  Patient presents emergently with intractable posterior neck pain. Patient underwent ACDF C4/5 & C6/7 in November, 2021. Unfortunately she developed severe recurrent neck pain. 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 Patient underwent posterior cervical fusion C4-7 on 2/22/22. Patient felt she could go home yesterday afternoon but developed intractable neck pain that was not controlled with high doses of oral Oxycodone. She called office this AM and was told to come to ER to be readmitted for assessment and pain management. Patient states pain so severe that she has difficulty moving. No weakness upper or lower extremities or change in bowel/bladder function. (24 Feb 2022 10:08) Medical consult called for medical management    2/25/22- Pt seen and examined at bedside. Endorsing improvement in neck pain but continues to endorse L lateral neck discomfort. States she is concerned about getting into and out of bed when home, but confirms has family support at home for help. States she has not had BM since monday. +Hydromorphone PCA pump. Denies CP, SOB, abd pain, n/v/d, numbness, tingling to extremities, bowel/bladder incontinence.     Review of system- All 10 systems reviewed and is as per HPI otherwise negative.     T(F): 97.7 (02-25-22 @ 11:27), Max: 98.1 (02-24-22 @ 20:13)  HR: 93 (02-25-22 @ 11:27) (84 - 98)  BP: 110/63 (02-25-22 @ 11:27) (96/54 - 137/90)  RR: 18 (02-25-22 @ 11:27) (16 - 18)  SpO2: 94% (02-25-22 @ 11:27) (94% - 100%)  Wt(kg): --    I&O's Summary    24 Feb 2022 07:01  -  25 Feb 2022 07:00  --------------------------------------------------------  IN: 0 mL / OUT: 70 mL / NET: -70 mL        LABS:                                   9.9    7.39  )-----------( 177      ( 25 Feb 2022 06:51 )             30.4       02-24    139  |  105  |  8   ----------------------------<  107<H>  3.6   |  30  |  0.81    Ca    9.1      24 Feb 2022 10:29    TPro  6.5  /  Alb  3.3  /  TBili  0.3  /  DBili  x   /  AST  37  /  ALT  57  /  AlkPhos  88  02-24    < from: Xray Cervical Spine AP, Lat, Dens Max 3 View (02.24.22 @ 10:46) >  PROCEDURE DATE:  02/24/2022          INTERPRETATION:  Clinical history: 56-year-old female, postop.    Three views of the cervical spine are compared to 2/22/2022 and   demonstrate anatomical alignment, patient is post anterior/posterior   fusion at C4-C7.    IMPRESSION:  Anatomical alignment, unchanged      PHYSICAL EXAM:  GENERAL: NAD, well-groomed, well-developed  HEAD:  Atraumatic, Normocephalic  EYES: EOMI, PERRLA, conjunctiva and sclera clear  HEENT: Moist mucous membranes  NECK: cervical dressing c/d/i,  +DANILO w/ decreasing drainage  NERVOUS SYSTEM:  Alert & Oriented X3, Motor Strength 5/5 B/L upper and lower extremities; DTRs 2+ intact and symmetric  CHEST/LUNG: Clear to auscultation bilaterally; No rales, rhonchi, wheezing, or rubs  HEART: Regular rate and rhythm; No murmurs, rubs, or gallops  ABDOMEN: Soft, Nontender, Nondistended; Bowel sounds present  GENITOURINARY- Voiding, no palpable bladder  EXTREMITIES:  2+ Peripheral Pulses, No clubbing, cyanosis, or edema  MUSCULOSKELTAL-  mild L paracervical ttp, Muscle tone normal, No joint tenderness, no Joint swelling,   SKIN-no rash, no lesion  CNS- alert, oriented X3, non focal     Daily Height in cm: 170.18 (24 Feb 2022 09:29)      MEDICATIONS  (STANDING):  celecoxib 200 milliGRAM(s) Oral daily  cyclobenzaprine 10 milliGRAM(s) Oral three times a day  fluticasone propionate (50 MICROgram(s)/actuation) Nasal Spray - Peds 1 Spray(s) Alternating Nostrils daily  HYDROmorphone PCA (1 mG/mL) 30 milliLiter(s) PCA Continuous PCA Continuous  lactated ringers. 1000 milliLiter(s) (30 mL/Hr) IV Continuous <Continuous>  loratadine 10 milliGRAM(s) Oral daily  montelukast 10 milliGRAM(s) Oral daily  pantoprazole    Tablet 40 milliGRAM(s) Oral before breakfast  polyethylene glycol 3350 17 Gram(s) Oral two times a day    MEDICATIONS  (PRN):  acetaminophen     Tablet .. 650 milliGRAM(s) Oral every 6 hours PRN Temp greater or equal to 38C (100.4F), Mild Pain (1 - 3)  aluminum hydroxide/magnesium hydroxide/simethicone Suspension 30 milliLiter(s) Oral every 4 hours PRN Dyspepsia  HYDROmorphone   Tablet 2 milliGRAM(s) Oral every 4 hours PRN Moderate Pain (4 - 6)  HYDROmorphone   Tablet 4 milliGRAM(s) Oral every 4 hours PRN Severe Pain (7 - 10)  HYDROmorphone PCA (1 mG/mL) Rescue Clinician Bolus 0.5 milliGRAM(s) IV Push every 15 minutes PRN for Pain Scale GREATER THAN 6  melatonin 3 milliGRAM(s) Oral at bedtime PRN Insomnia  naloxone Injectable 0.1 milliGRAM(s) IV Push every 3 minutes PRN For ANY of the following changes in patient status:  A. RR LESS THAN 10 breaths per minute, B. Oxygen saturation LESS THAN 90%, C. Sedation score of 6  ondansetron Injectable 4 milliGRAM(s) IV Push every 6 hours PRN Nausea  ondansetron Injectable 4 milliGRAM(s) IV Push every 8 hours PRN Nausea and/or Vomiting  oxyCODONE    IR 5 milliGRAM(s) Oral every 4 hours PRN Mild Pain (1 - 3)        Assessment/Plan  #Postlaminectomy syndrome s/p recent ACDF C4/5 and C6/7  Spine following  Pain meds via PCA dilaudid, tylenol   Muscle relaxants prn  Incentive spirometry  Bowel regimen   IVF    #anemia  HGB 9.9, asymptomatic, VSS  Repeat CBC in AM       #constipation   unrelieved with miralax  plan for mag citrate     #Allergic sinusitis  Cont flonase    #DVT proph-   Venodynes  OOB/ PT as tolerated    #Dispo- will continue to follow      CC-  Intractable post op neck pain (24 Feb 2022 10:08)    HPI:  Patient presents emergently with intractable posterior neck pain. Patient underwent ACDF C4/5 & C6/7 in November, 2021. Unfortunately she developed severe recurrent neck pain. 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 Patient underwent posterior cervical fusion C4-7 on 2/22/22. Patient felt she could go home yesterday afternoon but developed intractable neck pain that was not controlled with high doses of oral Oxycodone. She called office this AM and was told to come to ER to be readmitted for assessment and pain management. Patient states pain so severe that she has difficulty moving. No weakness upper or lower extremities or change in bowel/bladder function. (24 Feb 2022 10:08) Medical consult called for medical management    2/25/22- Pt seen and examined at bedside. Endorsing improvement in neck pain but continues to endorse L lateral neck discomfort. States she is concerned about getting into and out of bed when home, but confirms has family support at home for help. States she has not had BM since monday. +Hydromorphone PCA pump. Denies CP, SOB, abd pain, n/v/d, numbness, tingling to extremities, bowel/bladder incontinence.     Review of system- All 10 systems reviewed and is as per HPI otherwise negative.     T(F): 97.7 (02-25-22 @ 11:27), Max: 98.1 (02-24-22 @ 20:13)  HR: 93 (02-25-22 @ 11:27) (84 - 98)  BP: 110/63 (02-25-22 @ 11:27) (96/54 - 137/90)  RR: 18 (02-25-22 @ 11:27) (16 - 18)  SpO2: 94% (02-25-22 @ 11:27) (94% - 100%)  Wt(kg): --    I&O's Summary    24 Feb 2022 07:01  -  25 Feb 2022 07:00  --------------------------------------------------------  IN: 0 mL / OUT: 70 mL / NET: -70 mL        LABS:                                   9.9    7.39  )-----------( 177      ( 25 Feb 2022 06:51 )             30.4       02-24    139  |  105  |  8   ----------------------------<  107<H>  3.6   |  30  |  0.81    Ca    9.1      24 Feb 2022 10:29    TPro  6.5  /  Alb  3.3  /  TBili  0.3  /  DBili  x   /  AST  37  /  ALT  57  /  AlkPhos  88  02-24    < from: Xray Cervical Spine AP, Lat, Dens Max 3 View (02.24.22 @ 10:46) >  PROCEDURE DATE:  02/24/2022          INTERPRETATION:  Clinical history: 56-year-old female, postop.    Three views of the cervical spine are compared to 2/22/2022 and   demonstrate anatomical alignment, patient is post anterior/posterior   fusion at C4-C7.    IMPRESSION:  Anatomical alignment, unchanged      PHYSICAL EXAM:  GENERAL: NAD, well-groomed, well-developed  HEAD:  Atraumatic, Normocephalic  EYES: EOMI, PERRLA, conjunctiva and sclera clear  HEENT: Moist mucous membranes  NECK: cervical dressing c/d/i,  +DANILO w/ decreasing drainage  NERVOUS SYSTEM:  Alert & Oriented X3, Motor Strength 5/5 B/L upper and lower extremities; DTRs 2+ intact and symmetric  CHEST/LUNG: Clear to auscultation bilaterally; No rales, rhonchi, wheezing, or rubs  HEART: Regular rate and rhythm; No murmurs, rubs, or gallops  ABDOMEN: Soft, Nontender, Nondistended; Bowel sounds present  GENITOURINARY- Voiding, no palpable bladder  EXTREMITIES:  2+ Peripheral Pulses, No clubbing, cyanosis, or edema  MUSCULOSKELTAL-  mild L paracervical ttp, Muscle tone normal, No joint tenderness, no Joint swelling,   SKIN-no rash, no lesion  CNS- alert, oriented X3, non focal     Daily Height in cm: 170.18 (24 Feb 2022 09:29)      MEDICATIONS  (STANDING):  celecoxib 200 milliGRAM(s) Oral daily  cyclobenzaprine 10 milliGRAM(s) Oral three times a day  fluticasone propionate (50 MICROgram(s)/actuation) Nasal Spray - Peds 1 Spray(s) Alternating Nostrils daily  HYDROmorphone PCA (1 mG/mL) 30 milliLiter(s) PCA Continuous PCA Continuous  lactated ringers. 1000 milliLiter(s) (30 mL/Hr) IV Continuous <Continuous>  loratadine 10 milliGRAM(s) Oral daily  montelukast 10 milliGRAM(s) Oral daily  pantoprazole    Tablet 40 milliGRAM(s) Oral before breakfast  polyethylene glycol 3350 17 Gram(s) Oral two times a day    MEDICATIONS  (PRN):  acetaminophen     Tablet .. 650 milliGRAM(s) Oral every 6 hours PRN Temp greater or equal to 38C (100.4F), Mild Pain (1 - 3)  aluminum hydroxide/magnesium hydroxide/simethicone Suspension 30 milliLiter(s) Oral every 4 hours PRN Dyspepsia  HYDROmorphone   Tablet 2 milliGRAM(s) Oral every 4 hours PRN Moderate Pain (4 - 6)  HYDROmorphone   Tablet 4 milliGRAM(s) Oral every 4 hours PRN Severe Pain (7 - 10)  HYDROmorphone PCA (1 mG/mL) Rescue Clinician Bolus 0.5 milliGRAM(s) IV Push every 15 minutes PRN for Pain Scale GREATER THAN 6  melatonin 3 milliGRAM(s) Oral at bedtime PRN Insomnia  naloxone Injectable 0.1 milliGRAM(s) IV Push every 3 minutes PRN For ANY of the following changes in patient status:  A. RR LESS THAN 10 breaths per minute, B. Oxygen saturation LESS THAN 90%, C. Sedation score of 6  ondansetron Injectable 4 milliGRAM(s) IV Push every 6 hours PRN Nausea  ondansetron Injectable 4 milliGRAM(s) IV Push every 8 hours PRN Nausea and/or Vomiting  oxyCODONE    IR 5 milliGRAM(s) Oral every 4 hours PRN Mild Pain (1 - 3)        Assessment/Plan  #Postlaminectomy syndrome s/p recent ACDF C4/5 and C6/7  Spine following  Pain meds via PCA dilaudid, tylenol   Muscle relaxants prn  Incentive spirometry  Bowel regimen   IVF    #anemia  HGB 9.9, asymptomatic, VSS  Repeat CBC in AM     #constipation   unrelieved with miralax  plan for mag citrate     #Allergic sinusitis  Cont flonase    #DVT proph-   Venodynes  OOB/ PT as tolerated    #Dispo- will continue to follow

## 2022-02-25 NOTE — PROGRESS NOTE ADULT - ATTENDING COMMENTS
Patient is seen and examined at bedside with NP student Colletta Morgan  Pt is comfortable on PCA dilaudid. Has Lt-sided neck muscle spasm  +constipation- will give mag citrate  Ambulate  Agree with above assessment and plan

## 2022-02-26 PROCEDURE — 99232 SBSQ HOSP IP/OBS MODERATE 35: CPT

## 2022-02-26 RX ORDER — HYDROMORPHONE HYDROCHLORIDE 2 MG/ML
1 INJECTION INTRAMUSCULAR; INTRAVENOUS; SUBCUTANEOUS
Qty: 20 | Refills: 0
Start: 2022-02-26 | End: 2022-03-02

## 2022-02-26 RX ORDER — CELECOXIB 200 MG/1
1 CAPSULE ORAL
Qty: 30 | Refills: 0
Start: 2022-02-26 | End: 2022-03-27

## 2022-02-26 RX ADMIN — OXYCODONE HYDROCHLORIDE 5 MILLIGRAM(S): 5 TABLET ORAL at 17:01

## 2022-02-26 RX ADMIN — CYCLOBENZAPRINE HYDROCHLORIDE 10 MILLIGRAM(S): 10 TABLET, FILM COATED ORAL at 05:01

## 2022-02-26 RX ADMIN — PANTOPRAZOLE SODIUM 40 MILLIGRAM(S): 20 TABLET, DELAYED RELEASE ORAL at 05:02

## 2022-02-26 RX ADMIN — CELECOXIB 200 MILLIGRAM(S): 200 CAPSULE ORAL at 05:01

## 2022-02-26 RX ADMIN — HYDROMORPHONE HYDROCHLORIDE 4 MILLIGRAM(S): 2 INJECTION INTRAMUSCULAR; INTRAVENOUS; SUBCUTANEOUS at 22:35

## 2022-02-26 RX ADMIN — CYCLOBENZAPRINE HYDROCHLORIDE 10 MILLIGRAM(S): 10 TABLET, FILM COATED ORAL at 22:05

## 2022-02-26 RX ADMIN — HYDROMORPHONE HYDROCHLORIDE 4 MILLIGRAM(S): 2 INJECTION INTRAMUSCULAR; INTRAVENOUS; SUBCUTANEOUS at 18:10

## 2022-02-26 RX ADMIN — HYDROMORPHONE HYDROCHLORIDE 4 MILLIGRAM(S): 2 INJECTION INTRAMUSCULAR; INTRAVENOUS; SUBCUTANEOUS at 22:05

## 2022-02-26 RX ADMIN — HYDROMORPHONE HYDROCHLORIDE 4 MILLIGRAM(S): 2 INJECTION INTRAMUSCULAR; INTRAVENOUS; SUBCUTANEOUS at 17:40

## 2022-02-26 RX ADMIN — HYDROMORPHONE HYDROCHLORIDE 4 MILLIGRAM(S): 2 INJECTION INTRAMUSCULAR; INTRAVENOUS; SUBCUTANEOUS at 09:43

## 2022-02-26 RX ADMIN — CYCLOBENZAPRINE HYDROCHLORIDE 10 MILLIGRAM(S): 10 TABLET, FILM COATED ORAL at 13:41

## 2022-02-26 RX ADMIN — HYDROMORPHONE HYDROCHLORIDE 4 MILLIGRAM(S): 2 INJECTION INTRAMUSCULAR; INTRAVENOUS; SUBCUTANEOUS at 13:41

## 2022-02-26 RX ADMIN — HYDROMORPHONE HYDROCHLORIDE 4 MILLIGRAM(S): 2 INJECTION INTRAMUSCULAR; INTRAVENOUS; SUBCUTANEOUS at 14:11

## 2022-02-26 RX ADMIN — OXYCODONE HYDROCHLORIDE 5 MILLIGRAM(S): 5 TABLET ORAL at 16:31

## 2022-02-26 NOTE — PROGRESS NOTE ADULT - SUBJECTIVE AND OBJECTIVE BOX
HPI:  Patient presents emergently with intractable posterior neck pain. Patient underwent ACDF C4/5 & C6/7 in . Unfortunately she developed severe recurrent neck pain. 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 Patient underwent posterior cervical fusion C4-7 on 22. Patient felt she could go home yesterday afternoon but developed intractable neck pain that was not controlled with high doses of oral Oxycodone. She called office this AM and was told to come to ER to be readmitted for assessment and pain management. Patient states pain so severe that she has difficulty moving. No weakness upper or lower extremities or change in bowel/bladder function. (2022 10:08)    22 Patient with persistent moderate L side neck pain-no radicular pain. Difficulty transferring out of bed to the left. On PCA demand only  22 Patient much more comfortable this AM-slept 11-5 without meication    PAST MEDICAL & SURGICAL HISTORY:  Allergic sinusitis    Cervical disc herniation    GERD (gastroesophageal reflux disease)    History of colitis    Cervical disc disease     novel coronavirus disease (COVID-19)  2020--Hospitalized , pericarditis , recovered , followed with cardiology post discharge Dr Delta Davalos    Sleep apnea  No CPAP or oral appliance--patient states insurance did not cover    COVID-19 vaccine series completed  Pfizer --completed 2021, booster Moderna--2021    H/O  section  , ,    Cervical vertebral fusion  1992    History of laparoscopic cholecystectomy  2004    History of tonsillectomy  1969    History of cardiac cath  10-15 yrs ago    MEDICATIONS  (STANDING):  celecoxib 200 milliGRAM(s) Oral daily  cyclobenzaprine 10 milliGRAM(s) Oral three times a day  fluticasone propionate (50 MICROgram(s)/actuation) Nasal Spray - Peds 1 Spray(s) Alternating Nostrils daily  HYDROmorphone PCA (1 mG/mL) 30 milliLiter(s) PCA Continuous PCA Continuous  lactated ringers. 1000 milliLiter(s) (30 mL/Hr) IV Continuous <Continuous>  loratadine 10 milliGRAM(s) Oral daily  montelukast 10 milliGRAM(s) Oral daily  pantoprazole    Tablet 40 milliGRAM(s) Oral before breakfast  polyethylene glycol 3350 17 Gram(s) Oral two times a day    MEDICATIONS  (PRN):  acetaminophen     Tablet .. 650 milliGRAM(s) Oral every 6 hours PRN Temp greater or equal to 38C (100.4F), Mild Pain (1 - 3)  aluminum hydroxide/magnesium hydroxide/simethicone Suspension 30 milliLiter(s) Oral every 4 hours PRN Dyspepsia  HYDROmorphone PCA (1 mG/mL) Rescue Clinician Bolus 0.5 milliGRAM(s) IV Push every 15 minutes PRN for Pain Scale GREATER THAN 6  melatonin 3 milliGRAM(s) Oral at bedtime PRN Insomnia  naloxone Injectable 0.1 milliGRAM(s) IV Push every 3 minutes PRN For ANY of the following changes in patient status:  A. RR LESS THAN 10 breaths per minute, B. Oxygen saturation LESS THAN 90%, C. Sedation score of 6  ondansetron Injectable 4 milliGRAM(s) IV Push every 6 hours PRN Nausea  ondansetron Injectable 4 milliGRAM(s) IV Push every 8 hours PRN Nausea and/or Vomiting    Allergies    penicillin (Anaphylaxis)    Intolerances    PE:    Vital Signs Last 24 Hrs  T(C): 36.4 (2022 09:12), Max: 36.7 (2022 00:03)  T(F): 97.6 (2022 09:12), Max: 98 (2022 00:03)  HR: 98 (2022 09:12) (79 - 99)  BP: 119/64 (2022 09:12) (105/53 - 119/64)  BP(mean): --  RR: 18 (2022 09:12) (18 - 18)  SpO2: 100% (2022 09:12) (94% - 100%)    Patient sitting upright in bed-minimal pain complaints   Tolerating diet   Voiding without difficulty  DANILO with minimal drainage-removed  Dressing removed-incision clean and dry  Tender L paracervical muscle resolved  No motor deficits UEs  Negative Millie's/clonus/hyperreflexia    LABS                        9.9    7.39  )-----------( 177      ( 2022 06:51 )             30.4     02-24    139  |  105  |  8   ----------------------------<  107<H>  3.6   |  30  |  0.81    Ca    9.1      2022 10:29    TPro  6.5  /  Alb  3.3  /  TBili  0.3  /  DBili  x   /  AST  37  /  ALT  57  /  AlkPhos  88

## 2022-02-26 NOTE — PROGRESS NOTE ADULT - SUBJECTIVE AND OBJECTIVE BOX
CC-  Intractable post op neck pain (24 Feb 2022 10:08)    HPI:  Patient presents emergently with intractable posterior neck pain. Patient underwent ACDF C4/5 & C6/7 in November, 2021. Unfortunately she developed severe recurrent neck pain. 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 Patient underwent posterior cervical fusion C4-7 on 2/22/22. Patient felt she could go home yesterday afternoon but developed intractable neck pain that was not controlled with high doses of oral Oxycodone. She called office this AM and was told to come to ER to be readmitted for assessment and pain management. Patient states pain so severe that she has difficulty moving. No weakness upper or lower extremities or change in bowel/bladder function. (24 Feb 2022 10:08) Medical consult called for medical management    2/25/22- Pt seen and examined at bedside. Endorsing improvement in neck pain but continues to endorse L lateral neck discomfort. States she is concerned about getting into and out of bed when home, but confirms has family support at home for help. States she has not had BM since monday. +Hydromorphone PCA pump. Denies CP, SOB, abd pain, n/v/d, numbness, tingling to extremities, bowel/bladder incontinence.   2/26/22 off PCA, DANILO drain removed. No BM yet- did not drink much of mag citrate     Review of system- All 10 systems reviewed and is as per HPI otherwise negative.     Vital Signs Last 24 Hrs  T(C): 36.4 (26 Feb 2022 09:12), Max: 36.7 (26 Feb 2022 00:03)  T(F): 97.6 (26 Feb 2022 09:12), Max: 98 (26 Feb 2022 00:03)  HR: 98 (26 Feb 2022 09:12) (79 - 99)  BP: 119/64 (26 Feb 2022 09:12) (105/53 - 119/64)  BP(mean): --  RR: 18 (26 Feb 2022 09:12) (18 - 18)  SpO2: 100% (26 Feb 2022 09:12) (95% - 100%)    LABS:                        9.9    7.39  )-----------( 177      ( 25 Feb 2022 06:51 )             30.4     < from: Xray Cervical Spine AP, Lat, Dens Max 3 View (02.24.22 @ 10:46) >  PROCEDURE DATE:  02/24/2022          INTERPRETATION:  Clinical history: 56-year-old female, postop.    Three views of the cervical spine are compared to 2/22/2022 and   demonstrate anatomical alignment, patient is post anterior/posterior   fusion at C4-C7.    IMPRESSION:  Anatomical alignment, unchanged      PHYSICAL EXAM:  GENERAL: NAD, well-groomed, well-developed  HEAD:  Atraumatic, Normocephalic  EYES: EOMI, PERRLA, conjunctiva and sclera clear  HEENT: Moist mucous membranes  NECK: cervical dressing c/d/i,  DANILO removed  NERVOUS SYSTEM:  Alert & Oriented X3, Motor Strength 5/5 B/L upper and lower extremities; DTRs 2+ intact and symmetric  CHEST/LUNG: Clear to auscultation bilaterally; No rales, rhonchi, wheezing, or rubs  HEART: Regular rate and rhythm; No murmurs, rubs, or gallops  ABDOMEN: Soft, Nontender, Nondistended; Bowel sounds present  GENITOURINARY- Voiding, no palpable bladder  EXTREMITIES:  2+ Peripheral Pulses, No clubbing, cyanosis, or edema  MUSCULOSKELTAL-  mild L paracervical ttp, Muscle tone normal, No joint tenderness, no Joint swelling,   SKIN-no rash, no lesion  CNS- alert, oriented X3, non focal     Daily Height in cm: 170.18 (24 Feb 2022 09:29)      MEDICATIONS  (STANDING):  celecoxib 200 milliGRAM(s) Oral daily  cyclobenzaprine 10 milliGRAM(s) Oral three times a day  fluticasone propionate (50 MICROgram(s)/actuation) Nasal Spray - Peds 1 Spray(s) Alternating Nostrils daily  lactated ringers. 1000 milliLiter(s) (30 mL/Hr) IV Continuous <Continuous>  loratadine 10 milliGRAM(s) Oral daily  montelukast 10 milliGRAM(s) Oral daily  pantoprazole    Tablet 40 milliGRAM(s) Oral before breakfast  polyethylene glycol 3350 17 Gram(s) Oral two times a day    MEDICATIONS  (PRN):  acetaminophen     Tablet .. 650 milliGRAM(s) Oral every 6 hours PRN Temp greater or equal to 38C (100.4F), Mild Pain (1 - 3)  aluminum hydroxide/magnesium hydroxide/simethicone Suspension 30 milliLiter(s) Oral every 4 hours PRN Dyspepsia  HYDROmorphone   Tablet 2 milliGRAM(s) Oral every 4 hours PRN Moderate Pain (4 - 6)  HYDROmorphone   Tablet 4 milliGRAM(s) Oral every 4 hours PRN Severe Pain (7 - 10)  melatonin 3 milliGRAM(s) Oral at bedtime PRN Insomnia  ondansetron Injectable 4 milliGRAM(s) IV Push every 8 hours PRN Nausea and/or Vomiting  oxyCODONE    IR 5 milliGRAM(s) Oral every 4 hours PRN Mild Pain (1 - 3)    Assessment/Plan  #Postlaminectomy syndrome s/p recent ACDF C4/5 and C6/7  Spine following  Pain meds - pca is off, switched to PO dilaudid prn  Muscle relaxants prn  Incentive spirometry  Bowel regimen     #anemia likely dilutional  no new labs from today    #constipation   unrelieved with miralax, did not drink much of mag citrate  encouraged to complete mag citrate    #Allergic sinusitis  Cont flonase    #DVT proph-   Venodynes  OOB/ PT as tolerated    #Dispo- per spine. Possibly home later on today-tomorrow

## 2022-02-26 NOTE — PROGRESS NOTE ADULT - ASSESSMENT
Patient presents emergently with intractable posterior neck pain. Patient underwent ACDF C4/5 & C6/7 in November, 2021. Unfortunately she developed severe recurrent neck pain. 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 Patient underwent posterior cervical fusion C4-7 on 2/22/22. Patient felt she could go home yesterday afternoon but developed intractable neck pain that was not controlled with high doses of oral Oxycodone. She called office this AM and was told to come to ER to be readmitted for assessment and pain management. Patient states pain so severe that she has difficulty moving. No weakness upper or lower extremities or change in bowel/bladder function.    IMP:  Intractable post op pain  Post laminectomy syndrome cervical spine  Asthma    PLAN: POD #4  Patient admitted  Discontinue PCA-start oral meds only  Cervical collar for comfort  OOB/PT as tolerated  Flexeril 10 mg q8hr  D/C planning later today if patient well maintained on oral narcotics
Patient presents emergently with intractable posterior neck pain. Patient underwent ACDF C4/5 & C6/7 in November, 2021. Unfortunately she developed severe recurrent neck pain. 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 Patient underwent posterior cervical fusion C4-7 on 2/22/22. Patient felt she could go home yesterday afternoon but developed intractable neck pain that was not controlled with high doses of oral Oxycodone. She called office this AM and was told to come to ER to be readmitted for assessment and pain management. Patient states pain so severe that she has difficulty moving. No weakness upper or lower extremities or change in bowel/bladder function.    IMP:  Intractable post op pain  Post laminectomy syndrome cervical spine  Asthma    PLAN:  Patient admitted  Continue demand only PCA analgesia  Cervical collar for comfort  OOB/PT as tolerated  Flexeril 10 mg q8hr  Monitor drain output  Monitor labs  Monitor labs

## 2022-02-27 ENCOUNTER — TRANSCRIPTION ENCOUNTER (OUTPATIENT)
Age: 57
End: 2022-02-27

## 2022-02-27 VITALS
HEART RATE: 84 BPM | TEMPERATURE: 97 F | DIASTOLIC BLOOD PRESSURE: 57 MMHG | OXYGEN SATURATION: 96 % | SYSTOLIC BLOOD PRESSURE: 112 MMHG | RESPIRATION RATE: 17 BRPM

## 2022-02-27 PROCEDURE — 99232 SBSQ HOSP IP/OBS MODERATE 35: CPT

## 2022-02-27 RX ORDER — CYCLOBENZAPRINE HYDROCHLORIDE 10 MG/1
1 TABLET, FILM COATED ORAL
Qty: 0 | Refills: 0 | DISCHARGE
Start: 2022-02-27

## 2022-02-27 RX ADMIN — OXYCODONE HYDROCHLORIDE 5 MILLIGRAM(S): 5 TABLET ORAL at 06:42

## 2022-02-27 RX ADMIN — CELECOXIB 200 MILLIGRAM(S): 200 CAPSULE ORAL at 06:12

## 2022-02-27 RX ADMIN — HYDROMORPHONE HYDROCHLORIDE 4 MILLIGRAM(S): 2 INJECTION INTRAMUSCULAR; INTRAVENOUS; SUBCUTANEOUS at 09:10

## 2022-02-27 RX ADMIN — HYDROMORPHONE HYDROCHLORIDE 4 MILLIGRAM(S): 2 INJECTION INTRAMUSCULAR; INTRAVENOUS; SUBCUTANEOUS at 03:19

## 2022-02-27 RX ADMIN — CYCLOBENZAPRINE HYDROCHLORIDE 10 MILLIGRAM(S): 10 TABLET, FILM COATED ORAL at 06:11

## 2022-02-27 RX ADMIN — PANTOPRAZOLE SODIUM 40 MILLIGRAM(S): 20 TABLET, DELAYED RELEASE ORAL at 06:12

## 2022-02-27 RX ADMIN — OXYCODONE HYDROCHLORIDE 5 MILLIGRAM(S): 5 TABLET ORAL at 00:23

## 2022-02-27 RX ADMIN — OXYCODONE HYDROCHLORIDE 5 MILLIGRAM(S): 5 TABLET ORAL at 06:12

## 2022-02-27 RX ADMIN — HYDROMORPHONE HYDROCHLORIDE 4 MILLIGRAM(S): 2 INJECTION INTRAMUSCULAR; INTRAVENOUS; SUBCUTANEOUS at 02:49

## 2022-02-27 RX ADMIN — OXYCODONE HYDROCHLORIDE 5 MILLIGRAM(S): 5 TABLET ORAL at 00:53

## 2022-02-27 NOTE — DISCHARGE NOTE PROVIDER - HOSPITAL COURSE
Patient presents emergently with intractable posterior neck pain. Patient underwent ACDF C4/5 & C6/7 in November, 2021. Unfortunately she developed severe recurrent neck pain. 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 Patient underwent posterior cervical fusion C4-7 on 2/22/22. Patient felt she could go home yesterday afternoon but developed intractable neck pain that was not controlled with high doses of oral Oxycodone. She called office this AM and was told to come to ER to be readmitted for assessment and pain management. Patient states pain so severe that she has difficulty moving. No weakness upper or lower extremities or change in bowel/bladder function. (24 Feb 2022 10:08)    2/25/22 Patient with persistent moderate L side neck pain-no radicular pain. Difficulty transferring out of bed to the left. On PCA demand only  2/26/22 Patient much more comfortable this AM-slept 11-5 without medication. PCA stopped. DANILO removed. L neck pain better after drain out  2/27/22 Patient with expected post op neck pain. No radicular pain. Pain well controlled with oral narcotics. Neuro intact. Incision clean and dry. Stable for D/C home today

## 2022-02-27 NOTE — PROGRESS NOTE ADULT - REASON FOR ADMISSION
Intractable post op neck pain

## 2022-02-27 NOTE — DISCHARGE NOTE NURSING/CASE MANAGEMENT/SOCIAL WORK - PATIENT PORTAL LINK FT
You can access the FollowMyHealth Patient Portal offered by [facility] by registering at the following website: [email or web address]. By joining [employer]’s FollowMyHealth portal, you will also be able to view your health information using other applications (apps) compatible with our system.

## 2022-02-27 NOTE — DISCHARGE NOTE PROVIDER - NSDCMRMEDTOKEN_GEN_ALL_CORE_FT
Allegra 24 Hour Allergy oral tablet: 1 tab(s) orally once a day  azelastine 137 mcg/inh (0.1%) nasal spray: 1  nasal 2 times a day  celecoxib 200 mg oral capsule: 1 cap(s) orally once a day MDD:1  cyclobenzaprine 10 mg oral tablet: 1 tab(s) orally 3 times a day  Dilaudid 4 mg oral tablet: 1 tab(s) orally 4 times a day MDD:4  Flonase 50 mcg/inh nasal spray: 1 spray(s) nasal once a day  montelukast 10 mg oral tablet: 1 tab(s) orally once a day  omeprazole 40 mg oral delayed release capsule: 1 cap(s) orally once a day  Percocet 10/325 oral tablet: 1 tab(s) orally every 6 hours

## 2022-02-27 NOTE — DISCHARGE NOTE NURSING/CASE MANAGEMENT/SOCIAL WORK - NSDCVIVACCINE_GEN_ALL_CORE_FT
influenza, injectable, quadrivalent, preservative free; 20-Nov-2021 12:10; Haylee Holbrook (SIS); [employer]; [id number] (Exp. Date: 30-Jun-2022); IntraMuscular; Deltoid Left.; 0.5 milliLiter(s); VIS (VIS Published: 06-Aug-2021, VIS Presented: 20-Nov-2021);

## 2022-02-27 NOTE — DISCHARGE NOTE PROVIDER - NSDCCPCAREPLAN_GEN_ALL_CORE_FT
PRINCIPAL DISCHARGE DIAGNOSIS  Diagnosis: Post laminectomy syndrome  Assessment and Plan of Treatment:       SECONDARY DISCHARGE DIAGNOSES  Diagnosis: Intractable pain  Assessment and Plan of Treatment:

## 2022-02-27 NOTE — DISCHARGE NOTE NURSING/CASE MANAGEMENT/SOCIAL WORK - NSDCPEFALRISK_GEN_ALL_CORE
For information on Fall & Injury Prevention, visit: [email or web address] OR  [email or web address] OR  https://www.cdc.gov/steadi/patient.html

## 2022-02-27 NOTE — DISCHARGE NOTE PROVIDER - DISCHARGE DIET
normal appearance , without tenderness upon palpation , no deformities , trachea midline , Thyroid normal size , no thyroid nodules , no masses , no JVD , thyroid nontender Regular Diet - No restrictions

## 2022-02-27 NOTE — DISCHARGE NOTE PROVIDER - CARE PROVIDER_API CALL
Jacky Perez)  Orthopaedic Surgery  [address] [place] [address]  Phone: [phone]  Fax: [phone]  Follow Up Time:

## 2022-02-27 NOTE — PROGRESS NOTE ADULT - SUBJECTIVE AND OBJECTIVE BOX
CC-  Intractable post op neck pain (24 Feb 2022 10:08)    HPI:  Patient presents emergently with intractable posterior neck pain. Patient underwent ACDF C4/5 & C6/7 in November, 2021. Unfortunately she developed severe recurrent neck pain. 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 Patient underwent posterior cervical fusion C4-7 on 2/22/22. Patient felt she could go home yesterday afternoon but developed intractable neck pain that was not controlled with high doses of oral Oxycodone. She called office this AM and was told to come to ER to be readmitted for assessment and pain management. Patient states pain so severe that she has difficulty moving. No weakness upper or lower extremities or change in bowel/bladder function. (24 Feb 2022 10:08) Medical consult called for medical management    2/25/22- Pt seen and examined at bedside. Endorsing improvement in neck pain but continues to endorse L lateral neck discomfort. States she is concerned about getting into and out of bed when home, but confirms has family support at home for help. States she has not had BM since monday. +Hydromorphone PCA pump. Denies CP, SOB, abd pain, n/v/d, numbness, tingling to extremities, bowel/bladder incontinence.   2/26/22 off PCA, DANILO drain removed. No BM yet- did not drink much of mag citrate   2/27/22 pain is OK controlled on PO meds. +BM. Wants to go home today    Review of system- All 10 systems reviewed and is as per HPI otherwise negative.     Vital Signs Last 24 Hrs  T(C): 36.3 (27 Feb 2022 08:45), Max: 36.7 (26 Feb 2022 20:08)  T(F): 97.4 (27 Feb 2022 08:45), Max: 98 (26 Feb 2022 20:08)  HR: 84 (27 Feb 2022 08:45) (84 - 95)  BP: 112/57 (27 Feb 2022 08:45) (99/66 - 112/57)  BP(mean): --  RR: 17 (27 Feb 2022 08:45) (17 - 18)  SpO2: 96% (27 Feb 2022 08:45) (96% - 100%)    LABS:    no new labs    < from: Xray Cervical Spine AP, Lat, Dens Max 3 View (02.24.22 @ 10:46) >  PROCEDURE DATE:  02/24/2022          INTERPRETATION:  Clinical history: 56-year-old female, postop.    Three views of the cervical spine are compared to 2/22/2022 and   demonstrate anatomical alignment, patient is post anterior/posterior   fusion at C4-C7.    IMPRESSION:  Anatomical alignment, unchanged      PHYSICAL EXAM:  GENERAL: NAD, well-groomed, well-developed  HEAD:  Atraumatic, Normocephalic  EYES: EOMI, PERRLA, conjunctiva and sclera clear  HEENT: Moist mucous membranes  NECK: cervical dressing c/d/i,  DANILO removed  NERVOUS SYSTEM:  Alert & Oriented X3, Motor Strength 5/5 B/L upper and lower extremities; DTRs 2+ intact and symmetric  CHEST/LUNG: Clear to auscultation bilaterally; No rales, rhonchi, wheezing, or rubs  HEART: Regular rate and rhythm; No murmurs, rubs, or gallops  ABDOMEN: Soft, Nontender, Nondistended; Bowel sounds present  GENITOURINARY- Voiding, no palpable bladder  EXTREMITIES:  2+ Peripheral Pulses, No clubbing, cyanosis, or edema  MUSCULOSKELTAL-  mild L paracervical ttp, Muscle tone normal, No joint tenderness, no Joint swelling,   SKIN-no rash, no lesion  CNS- alert, oriented X3, non focal     Daily Height in cm: 170.18 (24 Feb 2022 09:29)      MEDICATIONS  (STANDING):  celecoxib 200 milliGRAM(s) Oral daily  cyclobenzaprine 10 milliGRAM(s) Oral three times a day  fluticasone propionate (50 MICROgram(s)/actuation) Nasal Spray - Peds 1 Spray(s) Alternating Nostrils daily  lactated ringers. 1000 milliLiter(s) (30 mL/Hr) IV Continuous <Continuous>  loratadine 10 milliGRAM(s) Oral daily  montelukast 10 milliGRAM(s) Oral daily  pantoprazole    Tablet 40 milliGRAM(s) Oral before breakfast  polyethylene glycol 3350 17 Gram(s) Oral two times a day    MEDICATIONS  (PRN):  acetaminophen     Tablet .. 650 milliGRAM(s) Oral every 6 hours PRN Temp greater or equal to 38C (100.4F), Mild Pain (1 - 3)  aluminum hydroxide/magnesium hydroxide/simethicone Suspension 30 milliLiter(s) Oral every 4 hours PRN Dyspepsia  HYDROmorphone   Tablet 2 milliGRAM(s) Oral every 4 hours PRN Moderate Pain (4 - 6)  HYDROmorphone   Tablet 4 milliGRAM(s) Oral every 4 hours PRN Severe Pain (7 - 10)  melatonin 3 milliGRAM(s) Oral at bedtime PRN Insomnia  ondansetron Injectable 4 milliGRAM(s) IV Push every 8 hours PRN Nausea and/or Vomiting  oxyCODONE    IR 5 milliGRAM(s) Oral every 4 hours PRN Mild Pain (1 - 3)    Assessment/Plan  #Postlaminectomy syndrome s/p recent ACDF C4/5 and C6/7  Spine following  Pain meds - ok controlled on Po dilaudid  Muscle relaxants prn  Incentive spirometry  Bowel regimen     #anemia likely dilutional  no new labs from today    #constipation   resolved, +BM    #Allergic sinusitis  Cont flonase    #DVT proph- ambulating    #Dispo- home today

## 2022-03-01 DIAGNOSIS — Z98.1 ARTHRODESIS STATUS: ICD-10-CM

## 2022-03-01 DIAGNOSIS — Z86.16 PERSONAL HISTORY OF COVID-19: ICD-10-CM

## 2022-03-01 DIAGNOSIS — F17.210 NICOTINE DEPENDENCE, CIGARETTES, UNCOMPLICATED: ICD-10-CM

## 2022-03-01 DIAGNOSIS — M96.0 PSEUDARTHROSIS AFTER FUSION OR ARTHRODESIS: ICD-10-CM

## 2022-03-01 DIAGNOSIS — G47.33 OBSTRUCTIVE SLEEP APNEA (ADULT) (PEDIATRIC): ICD-10-CM

## 2022-03-01 DIAGNOSIS — R82.71 BACTERIURIA: ICD-10-CM

## 2022-03-01 DIAGNOSIS — K21.9 GASTRO-ESOPHAGEAL REFLUX DISEASE WITHOUT ESOPHAGITIS: ICD-10-CM

## 2022-03-01 DIAGNOSIS — M54.12 RADICULOPATHY, CERVICAL REGION: ICD-10-CM

## 2022-03-01 DIAGNOSIS — Z91.81 HISTORY OF FALLING: ICD-10-CM

## 2022-03-01 DIAGNOSIS — J30.2 OTHER SEASONAL ALLERGIC RHINITIS: ICD-10-CM

## 2022-03-01 DIAGNOSIS — D72.829 ELEVATED WHITE BLOOD CELL COUNT, UNSPECIFIED: ICD-10-CM

## 2022-03-01 DIAGNOSIS — Z88.0 ALLERGY STATUS TO PENICILLIN: ICD-10-CM

## 2022-03-01 DIAGNOSIS — M48.02 SPINAL STENOSIS, CERVICAL REGION: ICD-10-CM

## 2022-03-01 DIAGNOSIS — R73.9 HYPERGLYCEMIA, UNSPECIFIED: ICD-10-CM

## 2022-03-03 DIAGNOSIS — Z86.16 PERSONAL HISTORY OF COVID-19: ICD-10-CM

## 2022-03-03 DIAGNOSIS — J30.9 ALLERGIC RHINITIS, UNSPECIFIED: ICD-10-CM

## 2022-03-03 DIAGNOSIS — K59.00 CONSTIPATION, UNSPECIFIED: ICD-10-CM

## 2022-03-03 DIAGNOSIS — Z88.0 ALLERGY STATUS TO PENICILLIN: ICD-10-CM

## 2022-03-03 DIAGNOSIS — M96.1 POSTLAMINECTOMY SYNDROME, NOT ELSEWHERE CLASSIFIED: ICD-10-CM

## 2022-03-03 DIAGNOSIS — Z98.1 ARTHRODESIS STATUS: ICD-10-CM

## 2022-03-03 DIAGNOSIS — K21.9 GASTRO-ESOPHAGEAL REFLUX DISEASE WITHOUT ESOPHAGITIS: ICD-10-CM

## 2022-03-03 DIAGNOSIS — G47.30 SLEEP APNEA, UNSPECIFIED: ICD-10-CM

## 2022-05-03 ENCOUNTER — APPOINTMENT (OUTPATIENT)
Dept: OBGYN | Facility: CLINIC | Age: 57
End: 2022-05-03
Payer: COMMERCIAL

## 2022-05-03 VITALS
WEIGHT: 151 LBS | BODY MASS INDEX: 23.7 KG/M2 | SYSTOLIC BLOOD PRESSURE: 114 MMHG | HEIGHT: 67 IN | DIASTOLIC BLOOD PRESSURE: 76 MMHG

## 2022-05-03 DIAGNOSIS — Z12.4 ENCOUNTER FOR SCREENING FOR MALIGNANT NEOPLASM OF CERVIX: ICD-10-CM

## 2022-05-03 DIAGNOSIS — F41.9 ANXIETY DISORDER, UNSPECIFIED: ICD-10-CM

## 2022-05-03 DIAGNOSIS — N60.19 DIFFUSE CYSTIC MASTOPATHY OF UNSPECIFIED BREAST: ICD-10-CM

## 2022-05-03 DIAGNOSIS — Z12.12 ENCOUNTER FOR SCREENING FOR MALIGNANT NEOPLASM OF RECTUM: ICD-10-CM

## 2022-05-03 DIAGNOSIS — M85.80 OTHER SPECIFIED DISORDERS OF BONE DENSITY AND STRUCTURE, UNSPECIFIED SITE: ICD-10-CM

## 2022-05-03 LAB
CYTOLOGY CVX/VAG DOC THIN PREP: NORMAL
DATE COLLECTED: NORMAL
HEMOCCULT SP1 STL QL: NEGATIVE
HPV HIGH+LOW RISK DNA PNL CVX: NOT DETECTED
QUALITY CONTROL: YES

## 2022-05-03 PROCEDURE — 82270 OCCULT BLOOD FECES: CPT

## 2022-05-03 PROCEDURE — 99396 PREV VISIT EST AGE 40-64: CPT

## 2022-05-05 NOTE — DISCHARGE NOTE NURSING/CASE MANAGEMENT/SOCIAL WORK - NSFLUVACAGEDISCH_IMM_ALL_CORE
Adult Alert-The patient is alert, awake and responds to voice. The patient is oriented to time, place, and person. The triage nurse is able to obtain subjective information.

## 2022-05-06 NOTE — END OF VISIT
[FreeTextEntry3] : I, Ashlyn Strauss solely acted as a scribe for Dr. Usama Roman on 05/03/2022 . All medical entries made by the scribe were at my, Dr. Roman's, direction and personally dictated by me on 05/03/2022 . I have reviewed the chart and agree that the record accurately reflects my personal performance of the history, physical exam, assessment and plan. I have also personally directed, reviewed, and agreed with the chart.

## 2022-05-06 NOTE — HISTORY OF PRESENT ILLNESS
[HPV test offered] : HPV test offered [Tubal Occlusion] : has had a tubal occlusion [Y] : Positive pregnancy history [Menarche Age: ____] : age at menarche was [unfilled] [Currently Active] : currently active [Men] : men [TextBox_4] : Annual gynecology exam [Mammogramdate] : 4/22/21 [TextBox_19] : br-2 [BreastSonogramDate] : 4/22/21 [TextBox_25] : per pt [PapSmeardate] : 4/20/21 [TextBox_31] : neg [BoneDensityDate] : 4/4/19 [TextBox_37] : osteopenia [ColonoscopyDate] : 2021 [TextBox_43] : madelyn [HPVDate] : 4/20/21 [TextBox_78] : neg [LMPDate] : 2015 [PGHxTotal] : 3 [[facility] Centerx[facility] CenterlTerm] : 3 [[facility] Hospitaliving] : 3 [FreeTextEntry1] : 2015

## 2022-05-06 NOTE — DISCUSSION/SUMMARY
[FreeTextEntry1] : -Annual gynecology exam done today. Pap smear collected.\par \par -Benign rectal exam. Hemoccult negative.\par \par -Benign breast exam. Recommended wearing a supportive bra to reduce neck pain. Prescription for mammogram screening and breast sonogram given.\par \par -Prescription for DEXA studies were given.\par \par -During this visit comprehensive counseling was given regarding the following concerns:\par \par 1. We discussed the need for proper nutrition and exercise. This includes cardiovascular and pelvic floor exercises.\par \par 2. We discussed the importance of maintaining proper vaccination schedule and we discussed the utility of the HPV, Influenza, Shingles, and TDaP vaccines. \par \par 3. We discussed the impact of chronic sun exposure and the risk for skin disease as a result. The benefits of a total body scan by a Dermatologist was reviewed.\par \par 4. We discussed the need for certain supplements for most people, which include Vitamin D3 and calcium rich foods with limitation on calcium supplementation by tabular form to 600 MG by mouth daily. As part of bone health program, we recommend weight bearing exercises and limited sun exposure (10-15 minutes daily) to help convert Vitamin D to its active form.\par \par -She will follow up in 1 year or as needed.\par \par -All questions and concerns were addressed.\par \par During this visit 20 minutes were spent face- to- face with greater than 50% of the time dedicated to counseling.

## 2022-05-06 NOTE — PHYSICAL EXAM
[Chaperone Present] : A chaperone was present in the examining room during all aspects of the physical examination [Appropriately responsive] : appropriately responsive [Alert] : alert [No Acute Distress] : no acute distress [No Lymphadenopathy] : no lymphadenopathy [Soft] : soft [Non-tender] : non-tender [Non-distended] : non-distended [No HSM] : No HSM [No Lesions] : no lesions [No Mass] : no mass [Oriented x3] : oriented x3 [Examination Of The Breasts] : a normal appearance [] : implants [No Discharge] : no discharge [No Masses] : no breast masses were palpable [Labia Majora] : normal [Labia Minora] : normal [No Bleeding] : There was no active vaginal bleeding [Normal] : normal [Uterine Adnexae] : normal [Normal rectal exam] : was normal [FreeTextEntry1] : JACK Goldberg [Occult Blood Positive] : was negative for occult blood analysis

## 2022-05-10 ENCOUNTER — NON-APPOINTMENT (OUTPATIENT)
Age: 57
End: 2022-05-10

## 2022-09-06 ENCOUNTER — APPOINTMENT (OUTPATIENT)
Dept: OBGYN | Facility: CLINIC | Age: 57
End: 2022-09-06

## 2022-09-06 VITALS
WEIGHT: 150 LBS | BODY MASS INDEX: 23.54 KG/M2 | HEIGHT: 67 IN | DIASTOLIC BLOOD PRESSURE: 82 MMHG | SYSTOLIC BLOOD PRESSURE: 130 MMHG

## 2022-09-06 DIAGNOSIS — N64.4 MASTODYNIA: ICD-10-CM

## 2022-09-06 DIAGNOSIS — Z12.39 ENCOUNTER FOR OTHER SCREENING FOR MALIGNANT NEOPLASM OF BREAST: ICD-10-CM

## 2022-09-06 PROCEDURE — 99214 OFFICE O/P EST MOD 30 MIN: CPT

## 2022-09-06 RX ORDER — CYCLOBENZAPRINE HCL 10 MG
10 TABLET ORAL
Refills: 0 | Status: DISCONTINUED | COMMUNITY
End: 2022-09-06

## 2022-09-06 NOTE — END OF VISIT
[FreeTextEntry3] : I, Ashlyn Strauss solely acted as a scribe for Dr. Usama Roman on 09/06/2022 . All medical entries made by the scribe were at my, Dr. Roman's, direction and personally dictated by me on 09/06/2022 . I have reviewed the chart and agree that the record accurately reflects my personal performance of the history, physical exam, assessment and plan. I have also personally directed, reviewed, and agreed with the chart.

## 2022-09-06 NOTE — PHYSICAL EXAM
[Chaperone Present] : A chaperone was present in the examining room during all aspects of the physical examination [Appropriately responsive] : appropriately responsive [Alert] : alert [No Acute Distress] : no acute distress [Oriented x3] : oriented x3 [Examination Of The Breasts] : a normal appearance [Normal] : normal [No Discharge] : no discharge [No Masses] : no breast masses were palpable [FreeTextEntry1] : THONG Elena

## 2022-09-17 ENCOUNTER — NON-APPOINTMENT (OUTPATIENT)
Age: 57
End: 2022-09-17

## 2022-09-17 ENCOUNTER — TRANSCRIPTION ENCOUNTER (OUTPATIENT)
Age: 57
End: 2022-09-17

## 2022-09-19 ENCOUNTER — NON-APPOINTMENT (OUTPATIENT)
Age: 57
End: 2022-09-19

## 2022-10-04 ENCOUNTER — APPOINTMENT (OUTPATIENT)
Dept: OBGYN | Facility: CLINIC | Age: 57
End: 2022-10-04

## 2022-10-24 ENCOUNTER — APPOINTMENT (OUTPATIENT)
Dept: BREAST CENTER | Facility: CLINIC | Age: 57
End: 2022-10-24

## 2022-11-16 NOTE — H&P PST ADULT - ENT GEN HX ROS MEA POS PC
Class II - visualization of the soft palate, fauces, and uvula sinus symptoms/nasal congestion/dysphagia

## 2023-01-09 NOTE — ED ADULT NURSE NOTE - NS ED NURSE RECORD ANOTHER VITAL SIGN
[facility]  [facility]       Chart reviewed, no opportunity found: CHART REVIEWED, NO OPPORTUNITY FOUND        Patients Insurance        Commercial Insurance: [address] [place] [address] Yes

## 2024-01-22 ENCOUNTER — APPOINTMENT (OUTPATIENT)
Dept: OBGYN | Facility: CLINIC | Age: 59
End: 2024-01-22
Payer: COMMERCIAL

## 2024-01-22 VITALS
DIASTOLIC BLOOD PRESSURE: 68 MMHG | SYSTOLIC BLOOD PRESSURE: 93 MMHG | HEIGHT: 67 IN | WEIGHT: 144 LBS | BODY MASS INDEX: 22.6 KG/M2

## 2024-01-22 DIAGNOSIS — Z13.820 ENCOUNTER FOR SCREENING FOR OSTEOPOROSIS: ICD-10-CM

## 2024-01-22 DIAGNOSIS — M85.80 OTHER SPECIFIED DISORDERS OF BONE DENSITY AND STRUCTURE, UNSPECIFIED SITE: ICD-10-CM

## 2024-01-22 LAB
DATE COLLECTED: NORMAL
HEMOCCULT SP1 STL QL: NEGATIVE
QUALITY CONTROL: YES

## 2024-01-22 PROCEDURE — 99396 PREV VISIT EST AGE 40-64: CPT

## 2024-01-22 PROCEDURE — 82270 OCCULT BLOOD FECES: CPT

## 2024-01-22 RX ORDER — CELECOXIB 200 MG/1
200 CAPSULE ORAL
Refills: 0 | Status: ACTIVE | COMMUNITY

## 2024-01-22 RX ORDER — DULOXETINE HYDROCHLORIDE 30 MG/1
30 CAPSULE, DELAYED RELEASE ORAL
Refills: 0 | Status: ACTIVE | COMMUNITY

## 2024-01-22 RX ORDER — TIZANIDINE 4 MG/1
4 TABLET ORAL
Refills: 0 | Status: ACTIVE | COMMUNITY

## 2024-01-22 RX ORDER — OXYCODONE HYDROCHLORIDE AND ACETAMINOPHEN 10; 325 MG/1; MG/1
10-325 TABLET ORAL
Refills: 0 | Status: ACTIVE | COMMUNITY

## 2024-01-22 RX ORDER — ESZOPICLONE 3 MG/1
3 TABLET, COATED ORAL
Refills: 0 | Status: ACTIVE | COMMUNITY

## 2024-01-23 RX ORDER — ESTRADIOL 1 MG/G
1 GEL TOPICAL
Qty: 3 | Refills: 3 | Status: ACTIVE | COMMUNITY
Start: 2024-01-23 | End: 1900-01-01

## 2024-01-23 RX ORDER — PROGESTERONE 100 MG/1
100 CAPSULE ORAL
Qty: 90 | Refills: 3 | Status: ACTIVE | COMMUNITY
Start: 2024-01-23 | End: 1900-01-01

## 2024-01-23 NOTE — PHYSICAL EXAM
[Chaperone Present] : A chaperone was present in the examining room during all aspects of the physical examination [Appropriately responsive] : appropriately responsive [Alert] : alert [No Acute Distress] : no acute distress [No Lymphadenopathy] : no lymphadenopathy [Soft] : soft [Non-tender] : non-tender [Non-distended] : non-distended [No HSM] : No HSM [No Lesions] : no lesions [No Mass] : no mass [Oriented x3] : oriented x3 [Examination Of The Breasts] : a normal appearance [No Masses] : no breast masses were palpable [Labia Majora] : normal [Labia Minora] : normal [Normal] : normal [Uterine Adnexae] : normal [Normal rectal exam] : was normal [Normal Brown Stool] : was normal and brown [FreeTextEntry1] : Yusra Babcock [] : implants [Breast Reconstruction Right] : breast reconstruction [Breast Reconstruction Left] : breast reconstruction [Occult Blood Positive] : was negative for occult blood analysis

## 2024-01-23 NOTE — REVIEW OF SYSTEMS
[Patient Intake Form Reviewed] : Patient intake form was reviewed [Sleep Disturbances] : sleep disturbances [Decreased Libido] : decreased libido [Hot Flashes] : hot flashes [Negative] : Heme/Lymph [FreeTextEntry8] : vaginal dryness

## 2024-01-23 NOTE — HISTORY OF PRESENT ILLNESS
[Tubal Occlusion] : has had a tubal occlusion [Y] : Positive pregnancy history [Menarche Age: ____] : age at menarche was [unfilled] [Currently Active] : currently active [Men] : men [Mammogramdate] : 09/12/2022 [TextBox_19] : BR2 [BreastSonogramDate] : 09/12/2022 [TextBox_25] : BR2 [PapSmeardate] : 05/03/2022 [TextBox_31] : NORMAL [BoneDensityDate] : 04/04/2019 [TextBox_37] : OSTEOPENIA [ColonoscopyDate] : 6/24/14 [HPVDate] : 05/03/2022 [TextBox_78] : NEG [LMPDate] : 2015 [PGHxTotal] : 3 [[facility] HospitallTerm] : 3 [[facility] Centeriving] : 3 [FreeTextEntry1] : 2015

## 2024-01-23 NOTE — END OF VISIT
[FreeTextEntry3] : I, Soraya Olmedo solely acted as a scribe for Dr. Usama Roman on 01/22/2024 . All medical entries made by the scribe were at my, Dr. Roman's, direction and personally dictated by me on 01/22/2024 . I have reviewed the chart and agree that the record accurately reflects my personal performance of the history, physical exam, assessment and plan. I have also personally directed, reviewed, and agreed with the chart.

## 2024-01-23 NOTE — DISCUSSION/SUMMARY
[FreeTextEntry1] : -Annual gynecology exam- 1) Pap smear collected. 2) Benign rectal exam. Hemoccult negative.  3) she will consider an opinion from another Plastic Surgeon (contact info for [facility] and J[facility] given) 4) she has symptoms of hypoestrogenism, and she will try topical bioidentical E2 gel and PO progesterone We discussed the issues, details of use, risks and benefits.    -During this visit comprehensive counseling was given regarding the following concerns: 1. We discussed the need for proper nutrition and exercise. This includes cardiovascular and pelvic floor exercises. 2. We discussed the importance of maintaining proper vaccination schedule and we discussed the utility of the HPV, Influenza, Shingles, and TDaP vaccines. 3. We discussed the impact of chronic sun exposure and the risk for skin disease as a result. The benefits of a total body scan by a Dermatologist was reviewed. 4. We discussed the need for certain supplements for most people, which include Vitamin D3 (2000 IU) and calcium rich foods with limitation on calcium supplementation by tabular form to 600 MG by mouth daily. As part of bone health program, we recommend weight bearing exercises and limited sun exposure (10-15 minutes daily) to help convert Vitamin D to its active form.   -She will follow up in 1 year or as needed. All questions and concerns were addressed.

## 2024-05-18 ENCOUNTER — TRANSCRIPTION ENCOUNTER (OUTPATIENT)
Age: 59
End: 2024-05-18

## 2024-05-18 PROBLEM — Z13.820 SCREENING FOR OSTEOPOROSIS: Status: ACTIVE | Noted: 2024-05-18

## 2024-05-18 PROBLEM — M85.80 OSTEOPENIA, UNSPECIFIED LOCATION: Status: ACTIVE | Noted: 2024-05-18

## 2024-05-18 LAB
CYTOLOGY CVX/VAG DOC THIN PREP: NORMAL
HPV HIGH+LOW RISK DNA PNL CVX: NOT DETECTED

## 2025-01-02 ENCOUNTER — LABORATORY RESULT (OUTPATIENT)
Age: 60
End: 2025-01-02

## 2025-01-02 ENCOUNTER — APPOINTMENT (OUTPATIENT)
Dept: OBGYN | Facility: CLINIC | Age: 60
End: 2025-01-02

## 2025-01-02 VITALS
SYSTOLIC BLOOD PRESSURE: 112 MMHG | DIASTOLIC BLOOD PRESSURE: 69 MMHG | HEIGHT: 67 IN | WEIGHT: 138 LBS | BODY MASS INDEX: 21.66 KG/M2

## 2025-01-02 DIAGNOSIS — N95.2 POSTMENOPAUSAL ATROPHIC VAGINITIS: ICD-10-CM

## 2025-01-02 DIAGNOSIS — G47.00 INSOMNIA, UNSPECIFIED: ICD-10-CM

## 2025-01-02 DIAGNOSIS — Z13.820 ENCOUNTER FOR SCREENING FOR OSTEOPOROSIS: ICD-10-CM

## 2025-01-02 DIAGNOSIS — N60.19 DIFFUSE CYSTIC MASTOPATHY OF UNSPECIFIED BREAST: ICD-10-CM

## 2025-01-02 DIAGNOSIS — F32.A DEPRESSION, UNSPECIFIED: ICD-10-CM

## 2025-01-02 DIAGNOSIS — Z12.12 ENCOUNTER FOR SCREENING FOR MALIGNANT NEOPLASM OF RECTUM: ICD-10-CM

## 2025-01-02 DIAGNOSIS — Z12.4 ENCOUNTER FOR SCREENING FOR MALIGNANT NEOPLASM OF CERVIX: ICD-10-CM

## 2025-01-02 DIAGNOSIS — F41.9 ANXIETY DISORDER, UNSPECIFIED: ICD-10-CM

## 2025-01-02 DIAGNOSIS — M85.80 OTHER SPECIFIED DISORDERS OF BONE DENSITY AND STRUCTURE, UNSPECIFIED SITE: ICD-10-CM

## 2025-01-02 DIAGNOSIS — Z12.39 ENCOUNTER FOR OTHER SCREENING FOR MALIGNANT NEOPLASM OF BREAST: ICD-10-CM

## 2025-01-02 DIAGNOSIS — R23.2 FLUSHING: ICD-10-CM

## 2025-01-02 PROCEDURE — 99459 PELVIC EXAMINATION: CPT

## 2025-01-02 PROCEDURE — 99215 OFFICE O/P EST HI 40 MIN: CPT | Mod: 25

## 2025-01-02 PROCEDURE — 99396 PREV VISIT EST AGE 40-64: CPT

## 2025-01-02 PROCEDURE — 82270 OCCULT BLOOD FECES: CPT

## 2025-01-02 RX ORDER — ESTRADIOL 1 MG/G
1 GEL TOPICAL
Qty: 3 | Refills: 3 | Status: ACTIVE | COMMUNITY
Start: 2025-01-02 | End: 1900-01-01

## 2025-01-02 RX ORDER — PROGESTERONE 100 MG/1
100 CAPSULE ORAL
Qty: 90 | Refills: 3 | Status: ACTIVE | COMMUNITY
Start: 2025-01-02 | End: 1900-01-01

## 2025-01-05 LAB — HPV HIGH+LOW RISK DNA PNL CVX: DETECTED

## 2025-01-06 ENCOUNTER — TRANSCRIPTION ENCOUNTER (OUTPATIENT)
Age: 60
End: 2025-01-06

## 2025-01-07 LAB — CYTOLOGY CVX/VAG DOC THIN PREP: NORMAL

## 2025-01-24 ENCOUNTER — RESULT REVIEW (OUTPATIENT)
Age: 60
End: 2025-01-24

## 2025-01-24 ENCOUNTER — APPOINTMENT (OUTPATIENT)
Dept: MAMMOGRAPHY | Facility: CLINIC | Age: 60
End: 2025-01-24
Payer: COMMERCIAL

## 2025-01-24 ENCOUNTER — OUTPATIENT (OUTPATIENT)
Dept: OUTPATIENT SERVICES | Facility: HOSPITAL | Age: 60
LOS: 1 days | End: 2025-01-24
Payer: COMMERCIAL

## 2025-01-24 ENCOUNTER — APPOINTMENT (OUTPATIENT)
Dept: ULTRASOUND IMAGING | Facility: CLINIC | Age: 60
End: 2025-01-24
Payer: COMMERCIAL

## 2025-01-24 ENCOUNTER — APPOINTMENT (OUTPATIENT)
Dept: RADIOLOGY | Facility: CLINIC | Age: 60
End: 2025-01-24
Payer: COMMERCIAL

## 2025-01-24 DIAGNOSIS — Z98.891 HISTORY OF UTERINE SCAR FROM PREVIOUS SURGERY: Chronic | ICD-10-CM

## 2025-01-24 DIAGNOSIS — Z98.890 OTHER SPECIFIED POSTPROCEDURAL STATES: Chronic | ICD-10-CM

## 2025-01-24 DIAGNOSIS — Z90.89 ACQUIRED ABSENCE OF OTHER ORGANS: Chronic | ICD-10-CM

## 2025-01-24 DIAGNOSIS — Z12.39 ENCOUNTER FOR OTHER SCREENING FOR MALIGNANT NEOPLASM OF BREAST: ICD-10-CM

## 2025-01-24 DIAGNOSIS — Z90.49 ACQUIRED ABSENCE OF OTHER SPECIFIED PARTS OF DIGESTIVE TRACT: Chronic | ICD-10-CM

## 2025-01-24 DIAGNOSIS — Z13.820 ENCOUNTER FOR SCREENING FOR OSTEOPOROSIS: ICD-10-CM

## 2025-01-24 DIAGNOSIS — M43.22 FUSION OF SPINE, CERVICAL REGION: Chronic | ICD-10-CM

## 2025-01-24 PROCEDURE — 77085 DXA BONE DENSITY AXL VRT FX: CPT | Mod: 26

## 2025-01-24 PROCEDURE — 77067 SCR MAMMO BI INCL CAD: CPT | Mod: 26

## 2025-01-24 PROCEDURE — 76641 ULTRASOUND BREAST COMPLETE: CPT | Mod: 26,50

## 2025-01-24 PROCEDURE — 77085 DXA BONE DENSITY AXL VRT FX: CPT

## 2025-01-24 PROCEDURE — 77063 BREAST TOMOSYNTHESIS BI: CPT | Mod: 26

## 2025-01-24 PROCEDURE — 77067 SCR MAMMO BI INCL CAD: CPT

## 2025-01-24 PROCEDURE — 76641 ULTRASOUND BREAST COMPLETE: CPT

## 2025-01-24 PROCEDURE — 77063 BREAST TOMOSYNTHESIS BI: CPT

## 2025-01-27 ENCOUNTER — APPOINTMENT (OUTPATIENT)
Dept: OBGYN | Facility: CLINIC | Age: 60
End: 2025-01-27
Payer: COMMERCIAL

## 2025-01-27 VITALS
SYSTOLIC BLOOD PRESSURE: 97 MMHG | WEIGHT: 138 LBS | HEIGHT: 67 IN | DIASTOLIC BLOOD PRESSURE: 61 MMHG | BODY MASS INDEX: 21.66 KG/M2

## 2025-01-27 DIAGNOSIS — Z72.0 TOBACCO USE: ICD-10-CM

## 2025-01-27 DIAGNOSIS — M85.80 OTHER SPECIFIED DISORDERS OF BONE DENSITY AND STRUCTURE, UNSPECIFIED SITE: ICD-10-CM

## 2025-01-27 DIAGNOSIS — F41.9 ANXIETY DISORDER, UNSPECIFIED: ICD-10-CM

## 2025-01-27 DIAGNOSIS — Z12.4 ENCOUNTER FOR SCREENING FOR MALIGNANT NEOPLASM OF CERVIX: ICD-10-CM

## 2025-01-27 DIAGNOSIS — R87.810 CERVICAL HIGH RISK HUMAN PAPILLOMAVIRUS (HPV) DNA TEST POSITIVE: ICD-10-CM

## 2025-01-27 PROCEDURE — 57454 BX/CURETT OF CERVIX W/SCOPE: CPT

## 2025-01-30 LAB — CORE LAB BIOPSY: NORMAL

## 2025-02-25 NOTE — PATIENT PROFILE ADULT - NSTOBACCOWITHDRW_GEN_A_CORE_SD
Patient: Juli Manzanares  YOB: 1954 70 y.o. female  Medical Record Number: [id number]    Chief Complaints:   Chief Complaint   Patient presents with    Right Knee - Pain, Follow-up       History of Present Illness:Juli Manzanares is a 70 y.o. female who presents for follow-up of  right knee pain. Doing PT and feel her strength has improved.  Patient states she is still having quite a bit discomfort despite tried over-the-counter medications and therapy particularly at night she has to get up.  Takes her a bit to count to get going feels a lot steady and some pain more laterally based.  She like to discuss other potential treatment options.    She is finishing getting some dental implants currently otherwise  No significant medical comorbidities.    Allergies:   Allergies   Allergen Reactions    Bactrim [Sulfamethoxazole-Trimethoprim] Unknown - Low Severity    Carbocaine [Mepivacaine Hcl] Unknown - Low Severity    Novocain [Procaine] Unknown - Low Severity    Penicillins Unknown - Low Severity    Clindamycin Rash     Rash and swelling on face    Latex Rash       Medications:   Current Outpatient Medications   Medication Sig Dispense Refill    BIOTIN PO Take 4,000 mcg by mouth.      calcium citrate-vitamin d (CITRACAL) 200-250 MG-UNIT tablet tablet Take  by mouth Daily.      cetirizine (zyrTEC) 10 MG tablet       chlorhexidine (PERIDEX) 0.12 % solution SWISH AND SPIT 5 MLS BY MOUTH THREE TIMES DAILY. DONT SWALLOW      Cholecalciferol (VITAMIN D3) 5000 UNITS capsule capsule Take 1 capsule by mouth Daily.      clobetasol (TEMOVATE) 0.05 % cream APPLY TO AFFECTED AREA TWICE A DAY ECZEMA UP TO 2 WEEKS      Clobetasol Propionate E 0.05 % emollient cream       meloxicam (MOBIC) 7.5 MG tablet TAKE 1 TABLET BY MOUTH EVERY DAY 30 tablet 3    Misc Natural Products (LUTEIN VISION BLEND PO)       Multiple Vitamins-Minerals (MULTI FOR HER 50+ PO) Take  by mouth.      rosuvastatin (Crestor) 10 MG tablet Take 1  "tablet by mouth Daily. 90 tablet 3    Wheat Dextrin (BENEFIBER DRINK MIX PO) Take  by mouth Every Morning.       No current facility-administered medications for this visit.         The following portions of the patient's history were reviewed and updated as appropriate: allergies, current medications, past family history, past medical history, past social history, past surgical history and problem list.    Review of Systems:   A 14 point review of systems was performed. All systems negative except pertinent positives/negative listed in HPI above    Physical Exam:   Vitals:    11/15/24 0824   Temp: 97.5 °F (36.4 °C)   TempSrc: Temporal   Weight: 79.1 kg (174 lb 6.4 oz)   Height: 170.2 cm (67.01\")   PainSc:   3   PainLoc: Knee       General: A and O x 3, ASA, NAD    SCLERA:    Normal    DENTITION:   Normal    Right knee examined valgus alignment.  Minimal tenderness laterally.  Range of motion 2-110          Assessment/Plan:  Right knee osteoarthritis    I discussed the findings with the patient.  We discussed both conservative and surgical treatment options.  She is tried several conservative treatments as noted above.  Really does not want to proceed with any kind of injections.  We did discuss surgery in detail including use of a model as well as risk and benefits.  I did discuss risk and benefits with the patient with risk including but not limited to bleeding, infection, damage to nearby nerves, vessels, tendons, ligaments, continued pain, worsening pain, fracture, dislocation, leg length discrepancy, blood clots, even death with anesthesia and possible need for future procedures surgeries.      Patient would like to think over things prior to proceeding with surgery.    Patient would require dental clearance prior.    Patient will give us a call or follow-up before scheduling.  All questions answered.    " yes irritability

## 2025-03-14 ENCOUNTER — APPOINTMENT (OUTPATIENT)
Dept: OBGYN | Facility: CLINIC | Age: 60
End: 2025-03-14
Payer: COMMERCIAL

## 2025-03-14 VITALS
DIASTOLIC BLOOD PRESSURE: 65 MMHG | SYSTOLIC BLOOD PRESSURE: 109 MMHG | HEIGHT: 67 IN | WEIGHT: 138 LBS | BODY MASS INDEX: 21.66 KG/M2

## 2025-03-14 DIAGNOSIS — L08.9 LOCAL INFECTION OF THE SKIN AND SUBCUTANEOUS TISSUE, UNSPECIFIED: ICD-10-CM

## 2025-03-14 PROCEDURE — 99213 OFFICE O/P EST LOW 20 MIN: CPT

## 2025-03-14 RX ORDER — MUPIROCIN 20 MG/G
2 OINTMENT TOPICAL 3 TIMES DAILY
Qty: 1 | Refills: 3 | Status: ACTIVE | COMMUNITY
Start: 2025-03-14 | End: 1900-01-01

## 2025-03-14 RX ORDER — ERYTHROMYCIN 20 MG/G
2 GEL TOPICAL TWICE DAILY
Qty: 1 | Refills: 3 | Status: ACTIVE | COMMUNITY
Start: 2025-03-14 | End: 1900-01-01